# Patient Record
Sex: FEMALE | Race: WHITE | NOT HISPANIC OR LATINO | Employment: UNEMPLOYED | URBAN - METROPOLITAN AREA
[De-identification: names, ages, dates, MRNs, and addresses within clinical notes are randomized per-mention and may not be internally consistent; named-entity substitution may affect disease eponyms.]

---

## 2019-04-02 ENCOUNTER — CONVERSION ENCOUNTER (OUTPATIENT)
Dept: OTHER | Facility: HOSPITAL | Age: 24
End: 2019-04-02

## 2019-04-02 ENCOUNTER — OFFICE VISIT CONVERTED (OUTPATIENT)
Dept: OTHER | Facility: HOSPITAL | Age: 24
End: 2019-04-02
Attending: NURSE PRACTITIONER

## 2019-04-02 ENCOUNTER — HOSPITAL ENCOUNTER (OUTPATIENT)
Dept: OTHER | Facility: HOSPITAL | Age: 24
Discharge: HOME | End: 2019-04-02
Attending: NURSE PRACTITIONER

## 2019-04-02 LAB
ALBUMIN SERPL-MCNC: 3.9 G/DL (ref 3.5–5)
ALBUMIN/GLOB SERPL: 1.4 {RATIO} (ref 1.4–2.6)
ALP SERPL-CCNC: 47 U/L (ref 42–98)
ALT SERPL-CCNC: 9 U/L (ref 10–40)
ANION GAP SERPL CALC-SCNC: 15 MMOL/L (ref 8–19)
AST SERPL-CCNC: 11 U/L (ref 15–50)
BASOPHILS # BLD AUTO: 0.03 10*3/UL (ref 0–0.2)
BASOPHILS NFR BLD AUTO: 0.4 % (ref 0–3)
BILIRUB SERPL-MCNC: 0.48 MG/DL (ref 0.2–1.3)
BUN SERPL-MCNC: 6 MG/DL (ref 5–25)
BUN/CREAT SERPL: 9 {RATIO} (ref 6–20)
CALCIUM SERPL-MCNC: 8.8 MG/DL (ref 8.7–10.4)
CHLORIDE SERPL-SCNC: 103 MMOL/L (ref 99–111)
CONV ABS IMM GRAN: 0.02 10*3/UL (ref 0–0.2)
CONV CO2: 26 MMOL/L (ref 22–32)
CONV IMMATURE GRAN: 0.3 % (ref 0–1.8)
CONV TOTAL PROTEIN: 6.7 G/DL (ref 6.3–8.2)
CREAT UR-MCNC: 0.64 MG/DL (ref 0.5–0.9)
DEPRECATED RDW RBC AUTO: 47.8 FL (ref 36.4–46.3)
EOSINOPHIL # BLD AUTO: 0.1 10*3/UL (ref 0–0.7)
EOSINOPHIL # BLD AUTO: 1.3 % (ref 0–7)
ERYTHROCYTE [DISTWIDTH] IN BLOOD BY AUTOMATED COUNT: 13.9 % (ref 11.7–14.4)
GFR SERPLBLD BASED ON 1.73 SQ M-ARVRAT: >60 ML/MIN/{1.73_M2}
GLOBULIN UR ELPH-MCNC: 2.8 G/DL (ref 2–3.5)
GLUCOSE SERPL-MCNC: 86 MG/DL (ref 65–99)
HBA1C MFR BLD: 13 G/DL (ref 12–16)
HCT VFR BLD AUTO: 41.4 % (ref 37–47)
LYMPHOCYTES # BLD AUTO: 4.33 10*3/UL (ref 1–5)
MCH RBC QN AUTO: 29.3 PG (ref 27–31)
MCHC RBC AUTO-ENTMCNC: 31.4 G/DL (ref 33–37)
MCV RBC AUTO: 93.2 FL (ref 81–99)
MONOCYTES # BLD AUTO: 0.47 10*3/UL (ref 0.2–1.2)
MONOCYTES NFR BLD AUTO: 6 % (ref 3–10)
NEUTROPHILS # BLD AUTO: 2.84 10*3/UL (ref 2–8)
NEUTROPHILS NFR BLD AUTO: 36.4 % (ref 30–85)
NRBC CBCN: 0 % (ref 0–0.7)
OSMOLALITY SERPL CALC.SUM OF ELEC: 287 MOSM/KG (ref 273–304)
PLATELET # BLD AUTO: 215 10*3/UL (ref 130–400)
PMV BLD AUTO: 12.6 FL (ref 9.4–12.3)
POTASSIUM SERPL-SCNC: 3.6 MMOL/L (ref 3.5–5.3)
RBC # BLD AUTO: 4.44 10*6/UL (ref 4.2–5.4)
SODIUM SERPL-SCNC: 140 MMOL/L (ref 135–147)
T4 FREE SERPL-MCNC: 1.1 NG/DL (ref 0.9–1.8)
TSH SERPL-ACNC: 2.83 M[IU]/L (ref 0.27–4.2)
VARIANT LYMPHS NFR BLD MANUAL: 55.6 % (ref 20–45)
WBC # BLD AUTO: 7.79 10*3/UL (ref 4.8–10.8)

## 2020-06-25 ENCOUNTER — APPOINTMENT (OUTPATIENT)
Dept: MRI IMAGING | Facility: HOSPITAL | Age: 25
End: 2020-06-25

## 2020-06-25 ENCOUNTER — HOSPITAL ENCOUNTER (OUTPATIENT)
Facility: HOSPITAL | Age: 25
Setting detail: OBSERVATION
Discharge: HOME | End: 2020-06-27
Attending: EMERGENCY MEDICINE | Admitting: INTERNAL MEDICINE

## 2020-06-25 DIAGNOSIS — R32 URINARY INCONTINENCE, UNSPECIFIED TYPE: ICD-10-CM

## 2020-06-25 DIAGNOSIS — M51.24 THORACIC DISC HERNIATION: Primary | ICD-10-CM

## 2020-06-25 PROBLEM — N31.9 NEUROGENIC BLADDER: Status: ACTIVE | Noted: 2020-06-25

## 2020-06-25 PROBLEM — M54.9 CHRONIC BACK PAIN: Status: ACTIVE | Noted: 2020-06-25

## 2020-06-25 PROBLEM — G89.29 CHRONIC BACK PAIN: Status: ACTIVE | Noted: 2020-06-25

## 2020-06-25 PROBLEM — Z98.890 HISTORY OF BACK SURGERY: Status: ACTIVE | Noted: 2020-06-25

## 2020-06-25 PROBLEM — F41.9 ANXIETY: Status: ACTIVE | Noted: 2020-06-25

## 2020-06-25 LAB
ANION GAP SERPL CALCULATED.3IONS-SCNC: 12.4 MMOL/L (ref 5–15)
BACTERIA UR QL AUTO: ABNORMAL /HPF
BILIRUB UR QL STRIP: NEGATIVE
BUN BLD-MCNC: 9 MG/DL (ref 6–20)
BUN/CREAT SERPL: 12.7 (ref 7–25)
CALCIUM SPEC-SCNC: 8.6 MG/DL (ref 8.6–10.5)
CHLORIDE SERPL-SCNC: 102 MMOL/L (ref 98–107)
CLARITY UR: ABNORMAL
CO2 SERPL-SCNC: 19.6 MMOL/L (ref 22–29)
COLOR UR: YELLOW
CREAT BLD-MCNC: 0.71 MG/DL (ref 0.57–1)
GFR SERPL CREATININE-BSD FRML MDRD: 100 ML/MIN/1.73
GLUCOSE BLD-MCNC: 101 MG/DL (ref 65–99)
GLUCOSE UR STRIP-MCNC: NEGATIVE MG/DL
HCG SERPL QL: NEGATIVE
HGB UR QL STRIP.AUTO: ABNORMAL
HYALINE CASTS UR QL AUTO: ABNORMAL /LPF
KETONES UR QL STRIP: ABNORMAL
LEUKOCYTE ESTERASE UR QL STRIP.AUTO: ABNORMAL
NITRITE UR QL STRIP: NEGATIVE
PH UR STRIP.AUTO: 5.5 [PH] (ref 5–8)
POTASSIUM BLD-SCNC: 4.3 MMOL/L (ref 3.5–5.2)
PROT UR QL STRIP: ABNORMAL
RBC # UR: ABNORMAL /HPF
REF LAB TEST METHOD: ABNORMAL
SODIUM BLD-SCNC: 134 MMOL/L (ref 136–145)
SP GR UR STRIP: >=1.03 (ref 1–1.03)
SQUAMOUS #/AREA URNS HPF: ABNORMAL /HPF
UROBILINOGEN UR QL STRIP: ABNORMAL
WBC UR QL AUTO: ABNORMAL /HPF

## 2020-06-25 PROCEDURE — 0 GADOBENATE DIMEGLUMINE 529 MG/ML SOLUTION: Performed by: EMERGENCY MEDICINE

## 2020-06-25 PROCEDURE — 84703 CHORIONIC GONADOTROPIN ASSAY: CPT | Performed by: EMERGENCY MEDICINE

## 2020-06-25 PROCEDURE — 25010000002 KETOROLAC TROMETHAMINE PER 15 MG: Performed by: EMERGENCY MEDICINE

## 2020-06-25 PROCEDURE — 25010000002 DEXAMETHASONE SODIUM PHOSPHATE 100 MG/10ML SOLUTION 10 ML VIAL: Performed by: EMERGENCY MEDICINE

## 2020-06-25 PROCEDURE — G0378 HOSPITAL OBSERVATION PER HR: HCPCS

## 2020-06-25 PROCEDURE — A9577 INJ MULTIHANCE: HCPCS | Performed by: EMERGENCY MEDICINE

## 2020-06-25 PROCEDURE — 99283 EMERGENCY DEPT VISIT LOW MDM: CPT

## 2020-06-25 PROCEDURE — 81001 URINALYSIS AUTO W/SCOPE: CPT | Performed by: EMERGENCY MEDICINE

## 2020-06-25 PROCEDURE — 80048 BASIC METABOLIC PNL TOTAL CA: CPT | Performed by: EMERGENCY MEDICINE

## 2020-06-25 PROCEDURE — 96375 TX/PRO/DX INJ NEW DRUG ADDON: CPT

## 2020-06-25 PROCEDURE — 72158 MRI LUMBAR SPINE W/O & W/DYE: CPT

## 2020-06-25 PROCEDURE — 96374 THER/PROPH/DIAG INJ IV PUSH: CPT

## 2020-06-25 RX ORDER — SODIUM CHLORIDE 0.9 % (FLUSH) 0.9 %
10 SYRINGE (ML) INJECTION EVERY 12 HOURS SCHEDULED
Status: DISCONTINUED | OUTPATIENT
Start: 2020-06-25 | End: 2020-06-27 | Stop reason: HOSPADM

## 2020-06-25 RX ORDER — HYDROXYZINE HYDROCHLORIDE 25 MG/1
25 TABLET, FILM COATED ORAL EVERY 8 HOURS PRN
Status: DISCONTINUED | OUTPATIENT
Start: 2020-06-25 | End: 2020-06-27 | Stop reason: HOSPADM

## 2020-06-25 RX ORDER — SODIUM CHLORIDE 0.9 % (FLUSH) 0.9 %
10 SYRINGE (ML) INJECTION AS NEEDED
Status: DISCONTINUED | OUTPATIENT
Start: 2020-06-25 | End: 2020-06-27 | Stop reason: HOSPADM

## 2020-06-25 RX ORDER — ONDANSETRON 2 MG/ML
4 INJECTION INTRAMUSCULAR; INTRAVENOUS EVERY 6 HOURS PRN
Status: DISCONTINUED | OUTPATIENT
Start: 2020-06-25 | End: 2020-06-27 | Stop reason: HOSPADM

## 2020-06-25 RX ORDER — DEXAMETHASONE SODIUM PHOSPHATE 4 MG/ML
8 INJECTION, SOLUTION INTRA-ARTICULAR; INTRALESIONAL; INTRAMUSCULAR; INTRAVENOUS; SOFT TISSUE EVERY 6 HOURS
Status: DISCONTINUED | OUTPATIENT
Start: 2020-06-26 | End: 2020-06-27 | Stop reason: HOSPADM

## 2020-06-25 RX ORDER — ACETAMINOPHEN 160 MG/5ML
650 SOLUTION ORAL EVERY 4 HOURS PRN
Status: DISCONTINUED | OUTPATIENT
Start: 2020-06-25 | End: 2020-06-27 | Stop reason: HOSPADM

## 2020-06-25 RX ORDER — HYDROXYZINE HYDROCHLORIDE 25 MG/1
25 TABLET, FILM COATED ORAL EVERY 8 HOURS PRN
COMMUNITY
End: 2020-08-04

## 2020-06-25 RX ORDER — KETOROLAC TROMETHAMINE 15 MG/ML
15 INJECTION, SOLUTION INTRAMUSCULAR; INTRAVENOUS ONCE
Status: COMPLETED | OUTPATIENT
Start: 2020-06-25 | End: 2020-06-25

## 2020-06-25 RX ORDER — ACETAMINOPHEN 650 MG/1
650 SUPPOSITORY RECTAL EVERY 4 HOURS PRN
Status: DISCONTINUED | OUTPATIENT
Start: 2020-06-25 | End: 2020-06-27 | Stop reason: HOSPADM

## 2020-06-25 RX ORDER — CYCLOBENZAPRINE HCL 10 MG
10 TABLET ORAL 3 TIMES DAILY PRN
Status: DISCONTINUED | OUTPATIENT
Start: 2020-06-25 | End: 2020-06-27 | Stop reason: HOSPADM

## 2020-06-25 RX ORDER — ACETAMINOPHEN 325 MG/1
650 TABLET ORAL EVERY 4 HOURS PRN
Status: DISCONTINUED | OUTPATIENT
Start: 2020-06-25 | End: 2020-06-27 | Stop reason: HOSPADM

## 2020-06-25 RX ADMIN — GADOBENATE DIMEGLUMINE 20 ML: 529 INJECTION, SOLUTION INTRAVENOUS at 20:21

## 2020-06-25 RX ADMIN — KETOROLAC TROMETHAMINE 15 MG: 15 INJECTION, SOLUTION INTRAMUSCULAR; INTRAVENOUS at 18:36

## 2020-06-25 RX ADMIN — DEXAMETHASONE SODIUM PHOSPHATE 10 MG: 10 INJECTION, SOLUTION INTRAMUSCULAR; INTRAVENOUS at 21:57

## 2020-06-25 RX ADMIN — CYCLOBENZAPRINE 10 MG: 10 TABLET, FILM COATED ORAL at 23:42

## 2020-06-25 RX ADMIN — SODIUM CHLORIDE, PRESERVATIVE FREE 10 ML: 5 INJECTION INTRAVENOUS at 23:42

## 2020-06-26 ENCOUNTER — APPOINTMENT (OUTPATIENT)
Dept: MRI IMAGING | Facility: HOSPITAL | Age: 25
End: 2020-06-26

## 2020-06-26 LAB
ANION GAP SERPL CALCULATED.3IONS-SCNC: 11.3 MMOL/L (ref 5–15)
BUN BLD-MCNC: 11 MG/DL (ref 6–20)
BUN/CREAT SERPL: 14.7 (ref 7–25)
CALCIUM SPEC-SCNC: 8.9 MG/DL (ref 8.6–10.5)
CHLORIDE SERPL-SCNC: 104 MMOL/L (ref 98–107)
CO2 SERPL-SCNC: 19.7 MMOL/L (ref 22–29)
CREAT BLD-MCNC: 0.75 MG/DL (ref 0.57–1)
DEPRECATED RDW RBC AUTO: 42.6 FL (ref 37–54)
ERYTHROCYTE [DISTWIDTH] IN BLOOD BY AUTOMATED COUNT: 13.2 % (ref 12.3–15.4)
GFR SERPL CREATININE-BSD FRML MDRD: 93 ML/MIN/1.73
GLUCOSE BLD-MCNC: 142 MG/DL (ref 65–99)
HCT VFR BLD AUTO: 38.7 % (ref 34–46.6)
HGB BLD-MCNC: 13.3 G/DL (ref 12–15.9)
MCH RBC QN AUTO: 30.2 PG (ref 26.6–33)
MCHC RBC AUTO-ENTMCNC: 34.4 G/DL (ref 31.5–35.7)
MCV RBC AUTO: 87.8 FL (ref 79–97)
PLATELET # BLD AUTO: 234 10*3/MM3 (ref 140–450)
PMV BLD AUTO: 11.1 FL (ref 6–12)
POTASSIUM BLD-SCNC: 4.4 MMOL/L (ref 3.5–5.2)
RBC # BLD AUTO: 4.41 10*6/MM3 (ref 3.77–5.28)
SARS-COV-2 N GENE NPH QL NAA+PROBE: NOT DETECTED
SODIUM BLD-SCNC: 135 MMOL/L (ref 136–145)
WBC NRBC COR # BLD: 8.55 10*3/MM3 (ref 3.4–10.8)

## 2020-06-26 PROCEDURE — 25010000002 ONDANSETRON PER 1 MG: Performed by: NURSE PRACTITIONER

## 2020-06-26 PROCEDURE — 25010000002 MORPHINE PER 10 MG: Performed by: NURSE PRACTITIONER

## 2020-06-26 PROCEDURE — 96376 TX/PRO/DX INJ SAME DRUG ADON: CPT

## 2020-06-26 PROCEDURE — 72157 MRI CHEST SPINE W/O & W/DYE: CPT

## 2020-06-26 PROCEDURE — G0378 HOSPITAL OBSERVATION PER HR: HCPCS

## 2020-06-26 PROCEDURE — 0 GADOBENATE DIMEGLUMINE 529 MG/ML SOLUTION: Performed by: INTERNAL MEDICINE

## 2020-06-26 PROCEDURE — 25010000002 DEXAMETHASONE PER 1 MG: Performed by: NURSE PRACTITIONER

## 2020-06-26 PROCEDURE — 87635 SARS-COV-2 COVID-19 AMP PRB: CPT | Performed by: NURSE PRACTITIONER

## 2020-06-26 PROCEDURE — 85027 COMPLETE CBC AUTOMATED: CPT | Performed by: NURSE PRACTITIONER

## 2020-06-26 PROCEDURE — A9577 INJ MULTIHANCE: HCPCS | Performed by: INTERNAL MEDICINE

## 2020-06-26 PROCEDURE — C9803 HOPD COVID-19 SPEC COLLECT: HCPCS | Performed by: NURSE PRACTITIONER

## 2020-06-26 PROCEDURE — 99203 OFFICE O/P NEW LOW 30 MIN: CPT | Performed by: NEUROLOGICAL SURGERY

## 2020-06-26 PROCEDURE — 96375 TX/PRO/DX INJ NEW DRUG ADDON: CPT

## 2020-06-26 PROCEDURE — 80048 BASIC METABOLIC PNL TOTAL CA: CPT | Performed by: NURSE PRACTITIONER

## 2020-06-26 PROCEDURE — 36415 COLL VENOUS BLD VENIPUNCTURE: CPT | Performed by: NURSE PRACTITIONER

## 2020-06-26 RX ORDER — NICOTINE 21 MG/24HR
1 PATCH, TRANSDERMAL 24 HOURS TRANSDERMAL
Status: DISCONTINUED | OUTPATIENT
Start: 2020-06-26 | End: 2020-06-27 | Stop reason: HOSPADM

## 2020-06-26 RX ORDER — MORPHINE SULFATE 2 MG/ML
2 INJECTION, SOLUTION INTRAMUSCULAR; INTRAVENOUS EVERY 4 HOURS PRN
Status: DISCONTINUED | OUTPATIENT
Start: 2020-06-26 | End: 2020-06-27 | Stop reason: HOSPADM

## 2020-06-26 RX ORDER — HYDROCODONE BITARTRATE AND ACETAMINOPHEN 7.5; 325 MG/1; MG/1
1 TABLET ORAL EVERY 6 HOURS PRN
Status: DISCONTINUED | OUTPATIENT
Start: 2020-06-26 | End: 2020-06-27 | Stop reason: HOSPADM

## 2020-06-26 RX ADMIN — NICOTINE 1 PATCH: 21 PATCH, EXTENDED RELEASE TRANSDERMAL at 13:22

## 2020-06-26 RX ADMIN — DEXAMETHASONE SODIUM PHOSPHATE 8 MG: 4 INJECTION, SOLUTION INTRAMUSCULAR; INTRAVENOUS at 09:17

## 2020-06-26 RX ADMIN — CYCLOBENZAPRINE 10 MG: 10 TABLET, FILM COATED ORAL at 17:55

## 2020-06-26 RX ADMIN — ONDANSETRON 4 MG: 2 INJECTION INTRAMUSCULAR; INTRAVENOUS at 23:12

## 2020-06-26 RX ADMIN — SODIUM CHLORIDE, PRESERVATIVE FREE 10 ML: 5 INJECTION INTRAVENOUS at 09:17

## 2020-06-26 RX ADMIN — ACETAMINOPHEN 650 MG: 325 TABLET ORAL at 09:31

## 2020-06-26 RX ADMIN — CYCLOBENZAPRINE 10 MG: 10 TABLET, FILM COATED ORAL at 07:50

## 2020-06-26 RX ADMIN — DEXAMETHASONE SODIUM PHOSPHATE 8 MG: 4 INJECTION, SOLUTION INTRAMUSCULAR; INTRAVENOUS at 15:21

## 2020-06-26 RX ADMIN — HYDROCODONE BITARTRATE AND ACETAMINOPHEN 1 TABLET: 7.5; 325 TABLET ORAL at 13:42

## 2020-06-26 RX ADMIN — GADOBENATE DIMEGLUMINE 20 ML: 529 INJECTION, SOLUTION INTRAVENOUS at 22:45

## 2020-06-26 RX ADMIN — MORPHINE SULFATE 2 MG: 2 INJECTION, SOLUTION INTRAMUSCULAR; INTRAVENOUS at 23:12

## 2020-06-26 RX ADMIN — SODIUM CHLORIDE, PRESERVATIVE FREE 10 ML: 5 INJECTION INTRAVENOUS at 20:44

## 2020-06-26 RX ADMIN — DEXAMETHASONE SODIUM PHOSPHATE 8 MG: 4 INJECTION, SOLUTION INTRAMUSCULAR; INTRAVENOUS at 02:58

## 2020-06-26 RX ADMIN — HYDROCODONE BITARTRATE AND ACETAMINOPHEN 1 TABLET: 7.5; 325 TABLET ORAL at 20:44

## 2020-06-26 RX ADMIN — DEXAMETHASONE SODIUM PHOSPHATE 8 MG: 4 INJECTION, SOLUTION INTRAMUSCULAR; INTRAVENOUS at 20:44

## 2020-06-27 VITALS
DIASTOLIC BLOOD PRESSURE: 49 MMHG | BODY MASS INDEX: 33.04 KG/M2 | SYSTOLIC BLOOD PRESSURE: 94 MMHG | OXYGEN SATURATION: 98 % | HEIGHT: 71 IN | WEIGHT: 236 LBS | RESPIRATION RATE: 16 BRPM | HEART RATE: 68 BPM | TEMPERATURE: 98 F

## 2020-06-27 PROCEDURE — G0378 HOSPITAL OBSERVATION PER HR: HCPCS

## 2020-06-27 PROCEDURE — 25010000002 MORPHINE PER 10 MG: Performed by: NURSE PRACTITIONER

## 2020-06-27 PROCEDURE — 25010000002 ONDANSETRON PER 1 MG: Performed by: NURSE PRACTITIONER

## 2020-06-27 PROCEDURE — 99213 OFFICE O/P EST LOW 20 MIN: CPT | Performed by: NURSE PRACTITIONER

## 2020-06-27 PROCEDURE — 25010000002 DEXAMETHASONE PER 1 MG: Performed by: NURSE PRACTITIONER

## 2020-06-27 PROCEDURE — 96376 TX/PRO/DX INJ SAME DRUG ADON: CPT

## 2020-06-27 RX ORDER — OXYCODONE HYDROCHLORIDE AND ACETAMINOPHEN 5; 325 MG/1; MG/1
1 TABLET ORAL EVERY 6 HOURS PRN
Qty: 20 TABLET | Refills: 0 | Status: SHIPPED | OUTPATIENT
Start: 2020-06-27 | End: 2020-07-02 | Stop reason: SDUPTHER

## 2020-06-27 RX ORDER — PREDNISONE 10 MG/1
10 TABLET ORAL TAKE AS DIRECTED
Qty: 20 TABLET | Refills: 0 | Status: SHIPPED | OUTPATIENT
Start: 2020-06-27 | End: 2020-08-04

## 2020-06-27 RX ADMIN — MORPHINE SULFATE 2 MG: 2 INJECTION, SOLUTION INTRAMUSCULAR; INTRAVENOUS at 05:00

## 2020-06-27 RX ADMIN — DEXAMETHASONE SODIUM PHOSPHATE 8 MG: 4 INJECTION, SOLUTION INTRAMUSCULAR; INTRAVENOUS at 10:42

## 2020-06-27 RX ADMIN — HYDROCODONE BITARTRATE AND ACETAMINOPHEN 1 TABLET: 7.5; 325 TABLET ORAL at 08:19

## 2020-06-27 RX ADMIN — ONDANSETRON 4 MG: 2 INJECTION INTRAMUSCULAR; INTRAVENOUS at 05:00

## 2020-06-27 RX ADMIN — HYDROXYZINE HYDROCHLORIDE 25 MG: 25 TABLET, FILM COATED ORAL at 10:43

## 2020-06-27 RX ADMIN — DEXAMETHASONE SODIUM PHOSPHATE 8 MG: 4 INJECTION, SOLUTION INTRAMUSCULAR; INTRAVENOUS at 05:00

## 2020-06-27 RX ADMIN — SODIUM CHLORIDE, PRESERVATIVE FREE 10 ML: 5 INJECTION INTRAVENOUS at 08:20

## 2020-06-27 RX ADMIN — NICOTINE 1 PATCH: 21 PATCH, EXTENDED RELEASE TRANSDERMAL at 10:43

## 2020-07-02 ENCOUNTER — TELEPHONE (OUTPATIENT)
Dept: NEUROSURGERY | Facility: CLINIC | Age: 25
End: 2020-07-02

## 2020-07-02 DIAGNOSIS — M51.24 THORACIC DISC HERNIATION: ICD-10-CM

## 2020-07-02 RX ORDER — OXYCODONE HYDROCHLORIDE AND ACETAMINOPHEN 5; 325 MG/1; MG/1
1 TABLET ORAL EVERY 8 HOURS PRN
Qty: 20 TABLET | Refills: 0 | Status: SHIPPED | OUTPATIENT
Start: 2020-07-02 | End: 2020-07-09

## 2020-07-02 NOTE — TELEPHONE ENCOUNTER
We can give her 20 more Percocet while she is waiting to get into see a pain management doctor.  What kind of pain management doctor does she see that does not prescribe pain medicines?

## 2020-07-02 NOTE — TELEPHONE ENCOUNTER
Patient is aware, this will be her last RX. She will talk to her PCP about getting into a new pain mgmt clinic. She is currently at one that only does procedures.

## 2020-07-02 NOTE — TELEPHONE ENCOUNTER
PATIENT IS SCHEDULE FOR 8/4 TO SEE DR IBRAHIM.  SHE STATES THAT PRIOR TO HER ER VISIT, SHE WAS NOT ON ANY TREATMENT PLAN FOR PAIN OTHER THAN EPIDURAL SHOTS.  SHE IS WONDERING IF SHE CAN GET SOMETHING FOR PAIN UNTIL HER APPT. PLEASE CALL PATIENT AND ADVISE.    [phone]

## 2020-07-02 NOTE — TELEPHONE ENCOUNTER
Patient is aware we do not prescribe anything for pain unless a patient has had surgery. She was sent home with MDP and Percocet's from the hospitalist. The pain mgmt doctor she see's does not prescribe anything for pain either. She wants to know if she can see a pain mgmt doctor on her own? I told her to get a referral from her PCP as she has [employer] insurance.

## 2020-08-03 NOTE — PROGRESS NOTES
Subjective   History of Present Illness: Poppy Carvalho is a 25 y.o. female is here today for hospital follow-up. Ms. Carvalho was last seen 6/26/2020 for back and leg pain.    Today in the office she reports that she has back pain that radiates into bilateral lower extremity.  Patient is wearing a mask in our office today.      History of Present Illness     This patient returns today.  She has had no further issues with bladder incontinence or any other sacral nerve dysfunction.  She does still have a lot of pain particularly in her right leg radiating down the posterior lateral thigh posterior lateral calf and into the right foot.  She says that when she sits for a long time her right foot becomes numb.  She has some symptoms in the left side as well but not as bad as the right.  She had one epidural block and it is after that she had the bladder incontinence.    The following portions of the patient's history were reviewed and updated as appropriate: allergies, current medications, past family history, past medical history, past social history, past surgical history and problem list.    Review of Systems   Respiratory: Negative for chest tightness and shortness of breath.    Cardiovascular: Negative for chest pain.   Musculoskeletal: Positive for back pain and myalgias.        Bilateral lower extremity pain   Neurological: Negative for weakness and numbness.       Objective     Vitals:    08/04/20 0839   BP: 128/78   Pulse: 79   Temp: 97.7 °F (36.5 °C)     There is no height or weight on file to calculate BMI.      Physical Exam   Constitutional: She is oriented to person, place, and time. She appears well-developed and well-nourished.   Eyes: Pupils are equal, round, and reactive to light. EOM are normal.   Neurological: She is oriented to person, place, and time. She has a normal Finger-Nose-Finger Test and a normal Heel to Shin Test. Gait normal.   Reflex Scores:       Tricep reflexes are 2+ on the right side and 2+  on the left side.       Bicep reflexes are 2+ on the right side and 2+ on the left side.       Brachioradialis reflexes are 2+ on the right side and 2+ on the left side.       Patellar reflexes are 2+ on the right side and 2+ on the left side.       Achilles reflexes are 2+ on the right side and 2+ on the left side.  Psychiatric: Her speech is normal.     Neurologic Exam     Mental Status   Oriented to person, place, and time.   Registration of memory: Good recent and remote memory.   Attention: normal. Concentration: normal.   Speech: speech is normal   Level of consciousness: alert  Knowledge: consistent with education.     Cranial Nerves     CN II   Visual fields full to confrontation.   Visual acuity: normal    CN III, IV, VI   Pupils are equal, round, and reactive to light.  Extraocular motions are normal.     CN V   Facial sensation intact.   Right corneal reflex: normal  Left corneal reflex: normal    CN VII   Facial expression full, symmetric.   Right facial weakness: none  Left facial weakness: none    CN VIII   Hearing: intact    CN IX, X   Palate: symmetric    CN XI   Right sternocleidomastoid strength: normal  Left sternocleidomastoid strength: normal    CN XII   Tongue: not atrophic  Tongue deviation: none    Motor Exam   Muscle bulk: normal  Right arm tone: normal  Left arm tone: normal  Right leg tone: normal  Left leg tone: normal    Strength   Strength 5/5 except as noted.     Sensory Exam   Light touch normal.     Gait, Coordination, and Reflexes     Gait  Gait: normal    Coordination   Finger to nose coordination: normal  Heel to shin coordination: normal    Reflexes   Right brachioradialis: 2+  Left brachioradialis: 2+  Right biceps: 2+  Left biceps: 2+  Right triceps: 2+  Left triceps: 2+  Right patellar: 2+  Left patellar: 2+  Right achilles: 2+  Left achilles: 2+  Right : 2+  Left : 2+          Assessment/Plan   Independent Review of Radiographic Studies:      I personally reviewed the  images from the following studies.    I again reviewed the MRIs that were done on 26 July.  This does show a disc bulge at T6-T7 and T10-T11 which is causing a little pressure on the spinal cord but there is still spinal fluid behind the spinal cord and no evidence of any severe spinal cord compression.  In the lumbar spine there is a bulging disc that is central and slightly to the right side at L4-5.    Medical Decision Making:      I had a very long discussion with the patient about the situation.  I told her I truly do not think that taking the discs in the thoracic spine out is either necessary or would do her any good.  I told her that her symptoms are most likely coming from the L4-5 level.  Since she had the issue with the epidural blocks I would not propose doing any further blocks.  Therefore her only options at this point are to continue to live with it or to proceed with surgery.  I clearly told her that my recommendation would be to lose some weight and do some exercises to avoid surgery but she does not feel she can do that.  I also clearly told her that the chances of a good result from surgery are markedly decreased since the disc is not very big but the pain is severe.  I told the patient about the risks, complications and expected outcome of the lumbar surgery.  I explained that there was an 60% chance of getting rid of the pain in the leg.  I explained that there would still be back pain after the surgery.  Initially this will be quite severe but will improve over time.  There is a 2 or 3% chance of infection, bleeding, CSF leak, damage to the nerve as a result of surgery, paralysis, as well as anesthetic risk.  There is a 5% chance of late instability which could require a fusion later on and a 10% chance of recurrent disc herniation.  We discussed the postoperative hospital and home course.  I also explained that we do not use very much pain medication after surgery.    She will need to be  scheduled for a: Right lumbar 4 lumbar 5 laminectomy and discectomy with metrx    Poppy was seen today for back pain and leg pain.    Diagnoses and all orders for this visit:    Neuritis or radiculitis due to rupture of lumbar intervertebral disc      Return for 2-3 week post op.

## 2020-08-04 ENCOUNTER — PREP FOR SURGERY (OUTPATIENT)
Dept: OTHER | Facility: HOSPITAL | Age: 25
End: 2020-08-04

## 2020-08-04 ENCOUNTER — OFFICE VISIT (OUTPATIENT)
Dept: NEUROSURGERY | Facility: CLINIC | Age: 25
End: 2020-08-04

## 2020-08-04 VITALS — TEMPERATURE: 97.7 F | DIASTOLIC BLOOD PRESSURE: 78 MMHG | SYSTOLIC BLOOD PRESSURE: 128 MMHG | HEART RATE: 79 BPM

## 2020-08-04 DIAGNOSIS — M51.16 NEURITIS OR RADICULITIS DUE TO RUPTURE OF LUMBAR INTERVERTEBRAL DISC: Primary | ICD-10-CM

## 2020-08-04 PROCEDURE — 99213 OFFICE O/P EST LOW 20 MIN: CPT | Performed by: NEUROLOGICAL SURGERY

## 2020-08-04 RX ORDER — CEFAZOLIN SODIUM 2 G/100ML
2 INJECTION, SOLUTION INTRAVENOUS ONCE
Status: CANCELLED | OUTPATIENT
Start: 2020-08-14 | End: 2020-08-04

## 2020-08-05 ENCOUNTER — APPOINTMENT (OUTPATIENT)
Dept: PREADMISSION TESTING | Facility: HOSPITAL | Age: 25
End: 2020-08-05

## 2020-08-05 ENCOUNTER — TRANSCRIBE ORDERS (OUTPATIENT)
Dept: PREADMISSION TESTING | Facility: HOSPITAL | Age: 25
End: 2020-08-05

## 2020-08-05 VITALS
SYSTOLIC BLOOD PRESSURE: 104 MMHG | RESPIRATION RATE: 16 BRPM | HEIGHT: 71 IN | OXYGEN SATURATION: 99 % | BODY MASS INDEX: 33.12 KG/M2 | TEMPERATURE: 99.2 F | HEART RATE: 87 BPM | DIASTOLIC BLOOD PRESSURE: 72 MMHG | WEIGHT: 236.6 LBS

## 2020-08-05 LAB
DEPRECATED RDW RBC AUTO: 42.5 FL (ref 37–54)
ERYTHROCYTE [DISTWIDTH] IN BLOOD BY AUTOMATED COUNT: 13 % (ref 12.3–15.4)
HCT VFR BLD AUTO: 42.3 % (ref 34–46.6)
HGB BLD-MCNC: 14.2 G/DL (ref 12–15.9)
MCH RBC QN AUTO: 29.8 PG (ref 26.6–33)
MCHC RBC AUTO-ENTMCNC: 33.6 G/DL (ref 31.5–35.7)
MCV RBC AUTO: 88.7 FL (ref 79–97)
PLATELET # BLD AUTO: 231 10*3/MM3 (ref 140–450)
PMV BLD AUTO: 11.5 FL (ref 6–12)
RBC # BLD AUTO: 4.77 10*6/MM3 (ref 3.77–5.28)
WBC # BLD AUTO: 9.74 10*3/MM3 (ref 3.4–10.8)

## 2020-08-05 PROCEDURE — 85027 COMPLETE CBC AUTOMATED: CPT | Performed by: NEUROLOGICAL SURGERY

## 2020-08-05 PROCEDURE — 36415 COLL VENOUS BLD VENIPUNCTURE: CPT

## 2020-08-05 RX ORDER — OXYCODONE HYDROCHLORIDE AND ACETAMINOPHEN 5; 325 MG/1; MG/1
1 TABLET ORAL EVERY 6 HOURS PRN
COMMUNITY
End: 2020-08-14 | Stop reason: HOSPADM

## 2020-08-05 NOTE — DISCHARGE INSTRUCTIONS
CHLORHEXIDINE CLOTH INSTRUCTIONS  The morning of surgery follow these instructions using the Chlorhexidine cloths you've been given.  These steps reduce bacteria on the body.  Do not use the cloths near your eyes, ears mouth, genitalia or on open wounds.  Throw the cloths away after use but do not try to flush them down a toilet.      • Open and remove one cloth at a time from the package.    • Leave the cloth unfolded and begin the bathing.  • Massage the skin with the cloths using gentle pressure to remove bacteria.  Do not scrub harshly.   • Follow the steps below with one 2% CHG cloth per area (6 total cloths).  • One cloth for neck, shoulders and chest.  • One cloth for both arms, hands, fingers and underarms (do underarms last).  • One cloth for the abdomen followed by groin.  • One cloth for right leg and foot including between the toes.  • One cloth for left leg and foot including between the toes.  • The last cloth is to be used for the back of the neck, back and buttocks.    Allow the CHG to air dry 3 minutes on the skin which will give it time to work and decrease the chance of irritation.  The skin may feel sticky until it is dry.  Do not rinse with water or any other liquid or you will lose the beneficial effects of the CHG.  If mild skin irritation occurs, do rinse the skin to remove the CHG.  Report this to the nurse at time of admission.  Do not apply lotions, creams, ointments, deodorants or perfumes after using the clothes. Dress in clean clothes before coming to the hospital    Take the following medications the morning of surgery: PERCOCET     ARRIVAL TIME 05:30        General Instructions:  • Do not eat solid food after midnight the night before surgery.  • You may drink clear liquids day of surgery but must stop at least one hour before your hospital arrival time.  • It is beneficial for you to have a clear drink that contains carbohydrates the day of surgery.  We suggest a 12 to 20 ounce  bottle of Gatorade or Powerade for non-diabetic patients or a 12 to 20 ounce bottle of G2 or Powerade Zero for diabetic patients. (Pediatric patients, are not advised to drink a 12 to 20 ounce carbohydrate drink)    Clear liquids are liquids you can see through.  Nothing red in color.     Plain water                               Sports drinks  Sodas                                   Gelatin (Jell-O)  Fruit juices without pulp such as white grape juice and apple juice  Popsicles that contain no fruit or yogurt  Tea or coffee (no cream or milk added)  Gatorade / Powerade  G2 / Powerade Zero      • Patients who avoid smoking, chewing tobacco and alcohol for 4 weeks prior to surgery have a reduced risk of post-operative complications.  Quit smoking as many days before surgery as you can.  • Do not smoke, use chewing tobacco or drink alcohol the day of surgery.   • If applicable bring your C-PAP/ BI-PAP machine.  • Bring any papers given to you in the doctor’s office.  • Wear clean comfortable clothes.  • Do not wear contact lenses, false eyelashes or make-up.  Bring a case for your glasses.   • Bring crutches or walker if applicable.  • Remove all piercings.  Leave jewelry and any other valuables at home.  • Hair extensions with metal clips must be removed prior to surgery.  • The Pre-Admission Testing nurse will instruct you to bring medications if unable to obtain an accurate list in Pre-Admission Testing.            Preventing a Surgical Site Infection:  • For 2 to 3 days before surgery, avoid shaving with a razor because the razor can irritate skin and make it easier to develop an infection.    • Any areas of open skin can increase the risk of a post-operative wound infection by allowing bacteria to enter and travel throughout the body.  Notify your surgeon if you have any skin wounds / rashes even if it is not near the expected surgical site.  The area will need assessed to determine if surgery should be delayed  until it is healed.  • The night prior to surgery shower using a fresh bar of anti-bacterial soap (such as Dial) and clean washcloth.  Sleep in a clean bed with clean clothing.  Do not allow pets to sleep with you.  • Shower on the morning of surgery using a fresh bar of anti-bacterial soap (such as Dial) and clean washcloth.  Dry with a clean towel and dress in clean clothing.  • Ask your surgeon if you will be receiving antibiotics prior to surgery.  • Make sure you, your family, and all healthcare providers clean their hands with soap and water or an alcohol based hand  before caring for you or your wound.    Day of surgery:  Your arrival time is approximately two hours before your scheduled surgery time.  Upon arrival, a Pre-op nurse and Anesthesiologist will review your health history, obtain vital signs, and answer questions you may have.  The only belongings needed at this time will be a list of your home medications and if applicable your C-PAP/BI-PAP machine.  If you are staying overnight your family can leave the rest of your belongings in the car and bring them to your room later.  A Pre-op nurse will start an IV and you may receive medication in preparation for surgery, including something to help you relax.  Your family will be able to see you in the Pre-op area.  Two visitors at a time will be allowed in the Pre-op room.  While you are in surgery your family should notify the waiting room  if they leave the waiting room area and provide a contact phone number.    Please be aware that surgery does come with discomfort.  We want to make every effort to control your discomfort so please discuss any uncontrolled symptoms with your nurse.   Your doctor will most likely have prescribed pain medications.      If you are going home after surgery you will receive individualized written care instructions before being discharged.  A responsible adult must drive you to and from the hospital on  the day of your surgery and stay with you for 24 hours.    If you are staying overnight following surgery, you will be transported to your hospital room following the recovery period.  [facility] has all private rooms.    If you have any questions please call Pre-Admission Testing at [phone].  Deductibles and co-payments are collected on the day of service. Please be prepared to pay the required co-pay, deductible or deposit on the day of service as defined by your plan.    Patient Education for Self-Quarantine Process    Following your COVID testing, we strongly recommend that you do not leave your home after you have been tested for COVID except to get medical care. This includes not going to work, school or to public areas.  If this is not possible for you to do please limit your activities to only required outings.  Be sure to wear a mask when you are with other people, practice social distancing and wash your hands frequently.      The following items provide additional details to keep you safe.  • Wash your hands with soap and water frequently for at least 20 seconds.   • Avoid touching your eyes, nose and mouth with unwashed hands.  • Do not share anything - utensils, towels, food from the same bowl.   • Have your own utensils, drinking glass, dishes, towels and bedding.   • Do not have visitors.   • Do use FaceTime to stay in touch with family and friends.  • You should stay in a specific room away from others if possible.   • Stay at least 6 feet away from others in the home if you cannot have a dedicated room to yourself.   • Do not snuggle with your pet. While the CDC says there is no evidence that pets can spread COVID-19 or be infected from humans, it is probably best to avoid “petting, snuggling, being kissed or licked and sharing food (during self-quarantine)”, according to the CDC.   • Sanitize household surfaces daily. Include all high touch areas (door handles, light switches,  phones, countertops, etc.)  • Do not share a bathroom with others, if possible.   • Wear a mask around others in your home if you are unable to stay in a separate room or 6 feet apart. If  you are unable to wear a mask, have your family member wear a mask if they must be within 6 feet of you.   Call your surgeon immediately if you experience any of the following symptoms:  • Sore Throat  • Shortness of Breath or difficulty breathing  • Cough  • Chills  • Body soreness or muscle pain  • Headache  • Fever  • New loss of taste or smell  • Do not arrive for your surgery ill.  Your procedure will need to be rescheduled to another time.  You will need to call your physician before the day of surgery to avoid any unnecessary exposure to hospital staff as well as other patients.

## 2020-08-12 ENCOUNTER — LAB (OUTPATIENT)
Dept: LAB | Facility: HOSPITAL | Age: 25
End: 2020-08-12

## 2020-08-12 PROCEDURE — U0004 COV-19 TEST NON-CDC HGH THRU: HCPCS

## 2020-08-12 PROCEDURE — C9803 HOPD COVID-19 SPEC COLLECT: HCPCS

## 2020-08-13 LAB
REF LAB TEST METHOD: NORMAL
SARS-COV-2 RNA RESP QL NAA+PROBE: NOT DETECTED

## 2020-08-14 ENCOUNTER — APPOINTMENT (OUTPATIENT)
Dept: GENERAL RADIOLOGY | Facility: HOSPITAL | Age: 25
End: 2020-08-14

## 2020-08-14 ENCOUNTER — HOSPITAL ENCOUNTER (OUTPATIENT)
Facility: HOSPITAL | Age: 25
Setting detail: HOSPITAL OUTPATIENT SURGERY
Discharge: HOME | End: 2020-08-14
Attending: NEUROLOGICAL SURGERY | Admitting: NEUROLOGICAL SURGERY

## 2020-08-14 ENCOUNTER — ANESTHESIA (OUTPATIENT)
Dept: PERIOP | Facility: HOSPITAL | Age: 25
End: 2020-08-14

## 2020-08-14 ENCOUNTER — ANESTHESIA EVENT (OUTPATIENT)
Dept: PERIOP | Facility: HOSPITAL | Age: 25
End: 2020-08-14

## 2020-08-14 VITALS
BODY MASS INDEX: 33.18 KG/M2 | DIASTOLIC BLOOD PRESSURE: 68 MMHG | SYSTOLIC BLOOD PRESSURE: 110 MMHG | OXYGEN SATURATION: 97 % | TEMPERATURE: 98 F | RESPIRATION RATE: 16 BRPM | HEART RATE: 76 BPM | WEIGHT: 237.9 LBS

## 2020-08-14 DIAGNOSIS — M51.16 NEURITIS OR RADICULITIS DUE TO RUPTURE OF LUMBAR INTERVERTEBRAL DISC: ICD-10-CM

## 2020-08-14 LAB
B-HCG UR QL: NEGATIVE
INTERNAL NEGATIVE CONTROL: NEGATIVE
INTERNAL POSITIVE CONTROL: POSITIVE
Lab: NORMAL

## 2020-08-14 PROCEDURE — 25010000002 HYDROMORPHONE PER 4 MG: Performed by: NURSE ANESTHETIST, CERTIFIED REGISTERED

## 2020-08-14 PROCEDURE — 72100 X-RAY EXAM L-S SPINE 2/3 VWS: CPT

## 2020-08-14 PROCEDURE — 25010000002 MIDAZOLAM PER 1 MG: Performed by: ANESTHESIOLOGY

## 2020-08-14 PROCEDURE — 25010000002 PROPOFOL 10 MG/ML EMULSION: Performed by: NURSE ANESTHETIST, CERTIFIED REGISTERED

## 2020-08-14 PROCEDURE — 25010000002 ONDANSETRON PER 1 MG: Performed by: NURSE ANESTHETIST, CERTIFIED REGISTERED

## 2020-08-14 PROCEDURE — 25010000002 FENTANYL CITRATE (PF) 100 MCG/2ML SOLUTION: Performed by: NURSE ANESTHETIST, CERTIFIED REGISTERED

## 2020-08-14 PROCEDURE — 25010000002 DEXAMETHASONE PER 1 MG: Performed by: NURSE ANESTHETIST, CERTIFIED REGISTERED

## 2020-08-14 PROCEDURE — 25010000003 CEFAZOLIN IN DEXTROSE 2-4 GM/100ML-% SOLUTION: Performed by: NEUROLOGICAL SURGERY

## 2020-08-14 PROCEDURE — 76000 FLUOROSCOPY <1 HR PHYS/QHP: CPT

## 2020-08-14 PROCEDURE — 25010000002 VANCOMYCIN 1 G RECONSTITUTED SOLUTION 1 EACH VIAL: Performed by: NEUROLOGICAL SURGERY

## 2020-08-14 PROCEDURE — 63030 LAMOT DCMPRN NRV RT 1 LMBR: CPT | Performed by: NEUROLOGICAL SURGERY

## 2020-08-14 PROCEDURE — 81025 URINE PREGNANCY TEST: CPT | Performed by: ANESTHESIOLOGY

## 2020-08-14 PROCEDURE — 25010000002 NEOSTIGMINE PER 0.5 MG: Performed by: NURSE ANESTHETIST, CERTIFIED REGISTERED

## 2020-08-14 DEVICE — FLOSEAL HEMOSTATIC MATRIX, 5ML
Type: IMPLANTABLE DEVICE | Site: SPINE LUMBAR | Status: FUNCTIONAL
Brand: FLOSEAL HEMOSTATIC MATRIX

## 2020-08-14 DEVICE — SSC BONE WAX
Type: IMPLANTABLE DEVICE | Site: SPINE LUMBAR | Status: FUNCTIONAL
Brand: SSC BONE WAX

## 2020-08-14 RX ORDER — ONDANSETRON 2 MG/ML
4 INJECTION INTRAMUSCULAR; INTRAVENOUS ONCE AS NEEDED
Status: COMPLETED | OUTPATIENT
Start: 2020-08-14 | End: 2020-08-14

## 2020-08-14 RX ORDER — PROMETHAZINE HYDROCHLORIDE 25 MG/ML
12.5 INJECTION, SOLUTION INTRAMUSCULAR; INTRAVENOUS ONCE AS NEEDED
Status: DISCONTINUED | OUTPATIENT
Start: 2020-08-14 | End: 2020-08-14 | Stop reason: HOSPADM

## 2020-08-14 RX ORDER — GLYCOPYRROLATE 0.2 MG/ML
INJECTION INTRAMUSCULAR; INTRAVENOUS AS NEEDED
Status: DISCONTINUED | OUTPATIENT
Start: 2020-08-14 | End: 2020-08-14 | Stop reason: SURG

## 2020-08-14 RX ORDER — NALOXONE HCL 0.4 MG/ML
0.2 VIAL (ML) INJECTION AS NEEDED
Status: DISCONTINUED | OUTPATIENT
Start: 2020-08-14 | End: 2020-08-14 | Stop reason: HOSPADM

## 2020-08-14 RX ORDER — FLUMAZENIL 0.1 MG/ML
0.2 INJECTION INTRAVENOUS AS NEEDED
Status: DISCONTINUED | OUTPATIENT
Start: 2020-08-14 | End: 2020-08-14 | Stop reason: HOSPADM

## 2020-08-14 RX ORDER — PROPOFOL 10 MG/ML
VIAL (ML) INTRAVENOUS AS NEEDED
Status: DISCONTINUED | OUTPATIENT
Start: 2020-08-14 | End: 2020-08-14 | Stop reason: SURG

## 2020-08-14 RX ORDER — SODIUM CHLORIDE, SODIUM LACTATE, POTASSIUM CHLORIDE, CALCIUM CHLORIDE 600; 310; 30; 20 MG/100ML; MG/100ML; MG/100ML; MG/100ML
100 INJECTION, SOLUTION INTRAVENOUS CONTINUOUS
Status: DISCONTINUED | OUTPATIENT
Start: 2020-08-14 | End: 2020-08-14 | Stop reason: HOSPADM

## 2020-08-14 RX ORDER — LIDOCAINE HYDROCHLORIDE 20 MG/ML
INJECTION, SOLUTION INFILTRATION; PERINEURAL AS NEEDED
Status: DISCONTINUED | OUTPATIENT
Start: 2020-08-14 | End: 2020-08-14 | Stop reason: SURG

## 2020-08-14 RX ORDER — DEXAMETHASONE SODIUM PHOSPHATE 10 MG/ML
INJECTION INTRAMUSCULAR; INTRAVENOUS AS NEEDED
Status: DISCONTINUED | OUTPATIENT
Start: 2020-08-14 | End: 2020-08-14 | Stop reason: SURG

## 2020-08-14 RX ORDER — ACETAMINOPHEN 325 MG/1
650 TABLET ORAL ONCE AS NEEDED
Status: DISCONTINUED | OUTPATIENT
Start: 2020-08-14 | End: 2020-08-14 | Stop reason: HOSPADM

## 2020-08-14 RX ORDER — SODIUM CHLORIDE 0.9 % (FLUSH) 0.9 %
3-10 SYRINGE (ML) INJECTION AS NEEDED
Status: DISCONTINUED | OUTPATIENT
Start: 2020-08-14 | End: 2020-08-14 | Stop reason: HOSPADM

## 2020-08-14 RX ORDER — LIDOCAINE HYDROCHLORIDE 10 MG/ML
0.5 INJECTION, SOLUTION EPIDURAL; INFILTRATION; INTRACAUDAL; PERINEURAL ONCE AS NEEDED
Status: DISCONTINUED | OUTPATIENT
Start: 2020-08-14 | End: 2020-08-14 | Stop reason: HOSPADM

## 2020-08-14 RX ORDER — FENTANYL CITRATE 50 UG/ML
50 INJECTION, SOLUTION INTRAMUSCULAR; INTRAVENOUS
Status: DISCONTINUED | OUTPATIENT
Start: 2020-08-14 | End: 2020-08-14 | Stop reason: HOSPADM

## 2020-08-14 RX ORDER — LABETALOL HYDROCHLORIDE 5 MG/ML
5 INJECTION, SOLUTION INTRAVENOUS
Status: DISCONTINUED | OUTPATIENT
Start: 2020-08-14 | End: 2020-08-14 | Stop reason: HOSPADM

## 2020-08-14 RX ORDER — HYDROMORPHONE HYDROCHLORIDE 1 MG/ML
0.5 INJECTION, SOLUTION INTRAMUSCULAR; INTRAVENOUS; SUBCUTANEOUS
Status: DISCONTINUED | OUTPATIENT
Start: 2020-08-14 | End: 2020-08-14 | Stop reason: HOSPADM

## 2020-08-14 RX ORDER — FAMOTIDINE 10 MG/ML
20 INJECTION, SOLUTION INTRAVENOUS ONCE
Status: COMPLETED | OUTPATIENT
Start: 2020-08-14 | End: 2020-08-14

## 2020-08-14 RX ORDER — SODIUM CHLORIDE 0.9 % (FLUSH) 0.9 %
3 SYRINGE (ML) INJECTION EVERY 12 HOURS SCHEDULED
Status: DISCONTINUED | OUTPATIENT
Start: 2020-08-14 | End: 2020-08-14 | Stop reason: HOSPADM

## 2020-08-14 RX ORDER — HYDROCODONE BITARTRATE AND ACETAMINOPHEN 7.5; 325 MG/1; MG/1
1 TABLET ORAL ONCE AS NEEDED
Status: COMPLETED | OUTPATIENT
Start: 2020-08-14 | End: 2020-08-14

## 2020-08-14 RX ORDER — PROMETHAZINE HYDROCHLORIDE 25 MG/1
25 TABLET ORAL ONCE AS NEEDED
Status: DISCONTINUED | OUTPATIENT
Start: 2020-08-14 | End: 2020-08-14 | Stop reason: HOSPADM

## 2020-08-14 RX ORDER — FENTANYL CITRATE 50 UG/ML
INJECTION, SOLUTION INTRAMUSCULAR; INTRAVENOUS AS NEEDED
Status: DISCONTINUED | OUTPATIENT
Start: 2020-08-14 | End: 2020-08-14 | Stop reason: SURG

## 2020-08-14 RX ORDER — HYDROMORPHONE HCL 110MG/55ML
PATIENT CONTROLLED ANALGESIA SYRINGE INTRAVENOUS AS NEEDED
Status: DISCONTINUED | OUTPATIENT
Start: 2020-08-14 | End: 2020-08-14 | Stop reason: SURG

## 2020-08-14 RX ORDER — MIDAZOLAM HYDROCHLORIDE 1 MG/ML
1 INJECTION INTRAMUSCULAR; INTRAVENOUS
Status: COMPLETED | OUTPATIENT
Start: 2020-08-14 | End: 2020-08-14

## 2020-08-14 RX ORDER — CEPHALEXIN 500 MG/1
500 CAPSULE ORAL 4 TIMES DAILY
Qty: 20 CAPSULE | Refills: 0 | Status: SHIPPED | OUTPATIENT
Start: 2020-08-14 | End: 2020-08-19

## 2020-08-14 RX ORDER — ONDANSETRON 2 MG/ML
INJECTION INTRAMUSCULAR; INTRAVENOUS AS NEEDED
Status: DISCONTINUED | OUTPATIENT
Start: 2020-08-14 | End: 2020-08-14 | Stop reason: SURG

## 2020-08-14 RX ORDER — CEFAZOLIN SODIUM 2 G/100ML
2 INJECTION, SOLUTION INTRAVENOUS ONCE
Status: COMPLETED | OUTPATIENT
Start: 2020-08-14 | End: 2020-08-14

## 2020-08-14 RX ORDER — PROMETHAZINE HYDROCHLORIDE 25 MG/1
25 SUPPOSITORY RECTAL ONCE AS NEEDED
Status: DISCONTINUED | OUTPATIENT
Start: 2020-08-14 | End: 2020-08-14 | Stop reason: HOSPADM

## 2020-08-14 RX ORDER — OXYCODONE AND ACETAMINOPHEN 7.5; 325 MG/1; MG/1
1 TABLET ORAL ONCE AS NEEDED
Status: DISCONTINUED | OUTPATIENT
Start: 2020-08-14 | End: 2020-08-14 | Stop reason: HOSPADM

## 2020-08-14 RX ORDER — ALBUTEROL SULFATE 2.5 MG/3ML
2.5 SOLUTION RESPIRATORY (INHALATION) ONCE AS NEEDED
Status: DISCONTINUED | OUTPATIENT
Start: 2020-08-14 | End: 2020-08-14 | Stop reason: HOSPADM

## 2020-08-14 RX ORDER — EPHEDRINE SULFATE 50 MG/ML
5 INJECTION, SOLUTION INTRAVENOUS ONCE AS NEEDED
Status: DISCONTINUED | OUTPATIENT
Start: 2020-08-14 | End: 2020-08-14 | Stop reason: HOSPADM

## 2020-08-14 RX ORDER — DIPHENHYDRAMINE HYDROCHLORIDE 50 MG/ML
12.5 INJECTION INTRAMUSCULAR; INTRAVENOUS
Status: DISCONTINUED | OUTPATIENT
Start: 2020-08-14 | End: 2020-08-14 | Stop reason: HOSPADM

## 2020-08-14 RX ORDER — DIPHENHYDRAMINE HCL 25 MG
25 CAPSULE ORAL
Status: DISCONTINUED | OUTPATIENT
Start: 2020-08-14 | End: 2020-08-14 | Stop reason: HOSPADM

## 2020-08-14 RX ORDER — SODIUM CHLORIDE, SODIUM LACTATE, POTASSIUM CHLORIDE, CALCIUM CHLORIDE 600; 310; 30; 20 MG/100ML; MG/100ML; MG/100ML; MG/100ML
9 INJECTION, SOLUTION INTRAVENOUS CONTINUOUS
Status: DISCONTINUED | OUTPATIENT
Start: 2020-08-14 | End: 2020-08-14 | Stop reason: HOSPADM

## 2020-08-14 RX ORDER — ROCURONIUM BROMIDE 10 MG/ML
INJECTION, SOLUTION INTRAVENOUS AS NEEDED
Status: DISCONTINUED | OUTPATIENT
Start: 2020-08-14 | End: 2020-08-14 | Stop reason: SURG

## 2020-08-14 RX ORDER — PROMETHAZINE HYDROCHLORIDE 25 MG/ML
6.25 INJECTION, SOLUTION INTRAMUSCULAR; INTRAVENOUS
Status: DISCONTINUED | OUTPATIENT
Start: 2020-08-14 | End: 2020-08-14 | Stop reason: HOSPADM

## 2020-08-14 RX ORDER — MAGNESIUM HYDROXIDE 1200 MG/15ML
LIQUID ORAL AS NEEDED
Status: DISCONTINUED | OUTPATIENT
Start: 2020-08-14 | End: 2020-08-14 | Stop reason: HOSPADM

## 2020-08-14 RX ORDER — HYDROCODONE BITARTRATE AND ACETAMINOPHEN 5; 325 MG/1; MG/1
1 TABLET ORAL EVERY 4 HOURS PRN
Qty: 35 TABLET | Refills: 0 | Status: SHIPPED | OUTPATIENT
Start: 2020-08-14 | End: 2020-08-26 | Stop reason: SDUPTHER

## 2020-08-14 RX ORDER — HYDRALAZINE HYDROCHLORIDE 20 MG/ML
5 INJECTION INTRAMUSCULAR; INTRAVENOUS
Status: DISCONTINUED | OUTPATIENT
Start: 2020-08-14 | End: 2020-08-14 | Stop reason: HOSPADM

## 2020-08-14 RX ADMIN — SODIUM CHLORIDE, POTASSIUM CHLORIDE, SODIUM LACTATE AND CALCIUM CHLORIDE: 600; 310; 30; 20 INJECTION, SOLUTION INTRAVENOUS at 08:51

## 2020-08-14 RX ADMIN — FAMOTIDINE 20 MG: 10 INJECTION INTRAVENOUS at 06:27

## 2020-08-14 RX ADMIN — LIDOCAINE HYDROCHLORIDE 100 MG: 20 INJECTION, SOLUTION INFILTRATION; PERINEURAL at 07:38

## 2020-08-14 RX ADMIN — PROPOFOL 250 MG: 10 INJECTION, EMULSION INTRAVENOUS at 07:38

## 2020-08-14 RX ADMIN — FENTANYL CITRATE 100 MCG: 50 INJECTION INTRAMUSCULAR; INTRAVENOUS at 07:38

## 2020-08-14 RX ADMIN — CEFAZOLIN SODIUM 2 G: 2 INJECTION, SOLUTION INTRAVENOUS at 07:30

## 2020-08-14 RX ADMIN — GLYCOPYRROLATE 0.6 MG: 0.2 INJECTION INTRAMUSCULAR; INTRAVENOUS at 08:49

## 2020-08-14 RX ADMIN — NEOSTIGMINE METHYLSULFATE 4 MG: 1 INJECTION INTRAMUSCULAR; INTRAVENOUS; SUBCUTANEOUS at 08:49

## 2020-08-14 RX ADMIN — ONDANSETRON HYDROCHLORIDE 4 MG: 2 SOLUTION INTRAMUSCULAR; INTRAVENOUS at 08:49

## 2020-08-14 RX ADMIN — SODIUM CHLORIDE, POTASSIUM CHLORIDE, SODIUM LACTATE AND CALCIUM CHLORIDE 9 ML/HR: 600; 310; 30; 20 INJECTION, SOLUTION INTRAVENOUS at 06:27

## 2020-08-14 RX ADMIN — FENTANYL CITRATE 50 MCG: 50 INJECTION, SOLUTION INTRAMUSCULAR; INTRAVENOUS at 09:31

## 2020-08-14 RX ADMIN — ROCURONIUM BROMIDE 50 MG: 10 INJECTION INTRAVENOUS at 07:38

## 2020-08-14 RX ADMIN — ONDANSETRON 4 MG: 2 INJECTION INTRAMUSCULAR; INTRAVENOUS at 09:17

## 2020-08-14 RX ADMIN — HYDROMORPHONE HYDROCHLORIDE 0.5 MG: 1 INJECTION, SOLUTION INTRAMUSCULAR; INTRAVENOUS; SUBCUTANEOUS at 10:03

## 2020-08-14 RX ADMIN — MIDAZOLAM 1 MG: 1 INJECTION INTRAMUSCULAR; INTRAVENOUS at 06:27

## 2020-08-14 RX ADMIN — HYDROMORPHONE HYDROCHLORIDE 0.5 MG: 2 INJECTION, SOLUTION INTRAMUSCULAR; INTRAVENOUS; SUBCUTANEOUS at 09:01

## 2020-08-14 RX ADMIN — HYDROMORPHONE HYDROCHLORIDE 0.5 MG: 2 INJECTION, SOLUTION INTRAMUSCULAR; INTRAVENOUS; SUBCUTANEOUS at 08:49

## 2020-08-14 RX ADMIN — HYDROCODONE BITARTRATE AND ACETAMINOPHEN 1 TABLET: 7.5; 325 TABLET ORAL at 10:15

## 2020-08-14 RX ADMIN — MIDAZOLAM 1 MG: 1 INJECTION INTRAMUSCULAR; INTRAVENOUS at 06:51

## 2020-08-14 RX ADMIN — DEXAMETHASONE SODIUM PHOSPHATE 8 MG: 10 INJECTION INTRAMUSCULAR; INTRAVENOUS at 07:56

## 2020-08-14 NOTE — OP NOTE
Preop diagnosis: Central and right-sided disc herniation L4-5 with lumbar radiculopathy    Postop diagnosis: same    Procedure performed: Right L4-5 laminectomy, medial facetectomy, foraminotomies and discectomy using microsurgical technique microsurgical instrumentation    Surgeon: Guy Allen M.D.    Assistant: Juany Rich CFA who was instrumental in helping with visualization of neural structures, hemostasis, and retraction of neural structures.  Her skilled assistance was necessary for the success of this case    Indications for the procedure:  This is a patient with severe pain in the legs.  Previous imaging had shown neural compression at the L4-5 levels.  As a result of this and the failure of conservative therapy the patient elected to proceed with surgery.    Operative summary:  After induction of general anesthesia the patient was intubated and placed on the operating table in the prone position on a Gilles table.  All pressure points were padded including peripheral points of entrapment.  The back was prepped with Chloraprep and then draped with Ioban, half sheets, and a split sheet.      The L4-5 level was localized with intraoperative fluoroscopy an incision was made on the right just above the pedicle.  Successive dilating tubes up to 18 mm by 6 cm were placed over that area.  Soft tissue was then removed from the supralaminar space.  The inferior laminar arch of L4 as well as the superior laminar arch of L5 and the medial aspect of facet were removed with the MaxLinear drill.  The remainder of the operation was done under high-power magnification of the operating microscope using microsurgical technique and microsurgical instrumentation.  The ligamentum flavum was opened and removed out to the level of the pedicle using the Kerrison rongeurs.  This exposed the lateral thecal sac and the nerve root of L5.  Once the lateral recess was opened the dissection was carried up to the L4 pedicle  completely decompressing the superior nerve root.    Once this was done the L5 nerve root was mobilized medially to expose the disc.  There was evidence of a disc herniation compressing the nerve just under the nerve root.  This was carefully teased out and then the disc space was incised and disc material was removed with the down-biting cervical curettes and the pituitary rongeurs.  Once the disc space was emptied as much as possible bleeding was controlled with bipolar cautery.    Once the entire decompression was completed we were able to explore under the nerve root and the thecal sac using the Perez ball probe to be sure there was no evidence of residual compression.there being none bleeding was controlled again using the bipolar cautery, FloSeal, and thrombin and Gelfoam.  The area was then copiously irrigated with vancomycin solution and the tube was removed. The paraspinous musculature was injected with 100 cc 1/8% Marcaine with 1:200,000 epinephrine solution.    Another gram of Kefzol was given prior to closure.    The incision was then closed in layersdressed and the patient was taken to the recovery room in stable condition there were no apparent complications.  Sponge, instrument, and needle counts were correct at the end of the procedure.

## 2020-08-14 NOTE — BRIEF OP NOTE
LUMBAR DISCECTOMY POSTERIOR WITH METRIX  Progress Note    Poppy EATSMAN Carvalho  8/14/2020    Pre-op Diagnosis:   Neuritis or radiculitis due to rupture of lumbar intervertebral disc [M51.16]       Post-Op Diagnosis Codes:     * Neuritis or radiculitis due to rupture of lumbar intervertebral disc [M51.16]    Procedure/CPT® Codes:        Procedure(s):  Right lumbar 4 lumbar 5 laminectomy and discectomy with metrx    Surgeon(s):  Guy Allen MD    Anesthesia: General    Staff:   Circulator: Alexsandra Croft RN; Raquel Worrell RN  Scrub Person: Sarah Valdivia  Assistant: Juany Rich CSA  Assistant: Juany Rich CSA      Estimated Blood Loss: minimal    Urine Voided: * No values recorded between 8/14/2020  7:29 AM and 8/14/2020  8:54 AM *    Specimens:                None          Drains: * No LDAs found *    Findings: Herniated disc    Complications: None    Guy Allen MD     Date: 8/14/2020  Time: 08:54

## 2020-08-14 NOTE — DISCHARGE INSTRUCTIONS
YOUR LAST PAIN PILL WAS GIVEN AT 10:15 THIS MORNING.        LUMBAR SURGERY - JANELLE IBRAHIM M.D.  [address], [address]  [place] [address]  [phone]    Instructions & Care After Your Lumbar Surgery    1. No sitting except on the commode.  You may lie on a firm couch but not on a waterbed or recliner.  You may lie in any position that is comfortable, using only one pillow under your head. Either stand at a counter or lie on your side for meals. Three weeks after surgery you may begin sitting for 30 minutes 3 times per day.    2. No driving for three weeks.  You may ride in the car in a passenger seat that reclines or lying down in the back seat.      3. No bending. If you drop something allow someone else to pick it up.    4. Don’t lift anything heavier than a coffee cup or paperback book.    5. Gradually increase your activity each day.  You should get out of bed every hour during the day.  Walk outside as soon as you feel up to it.  Walk short distances frequently rather than making a long trip.  Frequency is more important than distance.  Your goal is to be walking 2 to 3 miles per day when you return for your post-operative visit. (Never do this in one trip.)    6. You may climb stairs.    7. Remove your bandage the second day after surgery and leave it off.  If you notice any redness, swelling or drainage, call the office.  There are steri-strips across the incision.  If these are still present ten days after surgery, remove them gently.      8. You may shower five days after surgery.  Keep the incision dry until then.  Don’t let the water beat directly on the incision and gently pat it dry.    9. Physical Therapy will be arranged at your post-operative visit if needed.    10. Your prescription for pain medication may be filled for half the original amount prior to your return office visit.  Due to changes in Federal Law in order to have this medication refilled you must contact the office four days  prior to the date and make arrangements to pick the prescription up in the office.  This prescription refill cannot be called in to the pharmacy. Your prescription will be ready for pick-up the day the refill is due.    Don’t be alarmed if you experience some of your pre-operative symptoms after going home.  This is not uncommon and normally goes away in a few days but may last longer.  If you have any questions or concerns, please call our office.

## 2020-08-14 NOTE — ANESTHESIA PREPROCEDURE EVALUATION
Anesthesia Evaluation     Patient summary reviewed and Nursing notes reviewed   NPO Solid Status: > 8 hours  NPO Liquid Status: > 2 hours           Airway   Mallampati: II  TM distance: >3 FB  Neck ROM: full  Dental - normal exam     Pulmonary - normal exam   (+) a smoker Current,   Cardiovascular - normal exam        Neuro/Psych  (+) numbness, psychiatric history Anxiety, Depression and Bipolar,     GI/Hepatic/Renal/Endo    (+) obesity,  GERD,      Musculoskeletal     Abdominal    Substance History      OB/GYN          Other   arthritis,                      Anesthesia Plan    ASA 3     general       Anesthetic plan, all risks, benefits, and alternatives have been provided, discussed and informed consent has been obtained with: patient.

## 2020-08-14 NOTE — ANESTHESIA POSTPROCEDURE EVALUATION
Patient: Poppy Carvalho    Procedure Summary     Date:  08/14/20 Room / Location:  [facility] OR [address] [place] [address] MAIN OR    Anesthesia Start:  0729 Anesthesia Stop:  0911    Procedure:  Right lumbar 4 lumbar 5 laminectomy and discectomy with metrx (Right Spine Lumbar) Diagnosis:       Neuritis or radiculitis due to rupture of lumbar intervertebral disc      (Neuritis or radiculitis due to rupture of lumbar intervertebral disc [M51.16])    Surgeon:  Guy Allen MD Provider:  Celestino Rosa MD    Anesthesia Type:  general ASA Status:  3          Anesthesia Type: general    Vitals  Vitals Value Taken Time   /63 8/14/2020 10:35 AM   Temp 36.7 °C (98 °F) 8/14/2020 10:35 AM   Pulse 73 8/14/2020 10:35 AM   Resp 16 8/14/2020 10:35 AM   SpO2 97 % 8/14/2020 10:43 AM   Vitals shown include unvalidated device data.        Post Anesthesia Care and Evaluation    Patient location during evaluation: bedside  Patient participation: complete - patient participated  Level of consciousness: awake  Pain score: 2  Pain management: adequate  Airway patency: patent  Anesthetic complications: No anesthetic complications  PONV Status: none  Cardiovascular status: acceptable  Respiratory status: acceptable  Hydration status: acceptable    Comments: /63 (BP Location: Right arm, Patient Position: Lying)   Pulse 73   Temp 36.7 °C (98 °F)   Resp 16   Wt 108 kg (237 lb 14.4 oz)   SpO2 98%   BMI 33.18 kg/m²

## 2020-08-14 NOTE — ANESTHESIA PROCEDURE NOTES
Airway  Urgency: elective    Date/Time: 8/14/2020 7:39 AM  Airway not difficult    General Information and Staff    Patient location during procedure: OR  Anesthesiologist: Celestino Rosa MD  CRNA: Argenis Lind CRNA    Indications and Patient Condition  Indications for airway management: airway protection    Preoxygenated: yes  MILS not maintained throughout  Mask difficulty assessment: 1 - vent by mask    Final Airway Details  Final airway type: endotracheal airway      Successful airway: ETT  Cuffed: yes   Successful intubation technique: direct laryngoscopy  Facilitating devices/methods: intubating stylet  Endotracheal tube insertion site: oral  Blade: Saulo  Blade size: 3  ETT size (mm): 7.0  Cormack-Lehane Classification: grade I - full view of glottis  Placement verified by: chest auscultation   Cuff volume (mL): 8  Measured from: lips  Number of attempts at approach: 1  Assessment: lips, teeth, and gum same as pre-op and atraumatic intubation    Additional Comments  PreO2 100% face mask, IV induction, easy mask, DVL x1, cords noted, tube through, cuff up, EBBSH, +etCO2, = chest movement, tube secured in place, atraumatic, teeth and lips intact as preop.

## 2020-08-19 ENCOUNTER — TELEPHONE (OUTPATIENT)
Dept: NEUROSURGERY | Facility: CLINIC | Age: 25
End: 2020-08-19

## 2020-08-26 DIAGNOSIS — M51.16 NEURITIS OR RADICULITIS DUE TO RUPTURE OF LUMBAR INTERVERTEBRAL DISC: ICD-10-CM

## 2020-08-26 RX ORDER — HYDROCODONE BITARTRATE AND ACETAMINOPHEN 5; 325 MG/1; MG/1
1 TABLET ORAL EVERY 6 HOURS PRN
Qty: 20 TABLET | Refills: 0 | Status: SHIPPED | OUTPATIENT
Start: 2020-08-26 | End: 2021-10-04

## 2020-08-26 NOTE — TELEPHONE ENCOUNTER
Patient called and requested her 2nd refill of pain medication. She had Right L4-5 laminectomy, medial facetectomy, foraminotomies and discectomy using microsurgical technique microsurgical instrumentation done on 8/14/2020. I explained the 2nd refill will be for #20 pills as she should start weaning off. She verbalized understanding.

## 2020-09-01 ENCOUNTER — OFFICE VISIT (OUTPATIENT)
Dept: NEUROSURGERY | Facility: CLINIC | Age: 25
End: 2020-09-01

## 2020-09-01 DIAGNOSIS — Z09 FOLLOW-UP EXAMINATION FOLLOWING SURGERY: Primary | ICD-10-CM

## 2020-09-01 PROCEDURE — 99024 POSTOP FOLLOW-UP VISIT: CPT | Performed by: NEUROLOGICAL SURGERY

## 2020-09-15 ENCOUNTER — HOSPITAL ENCOUNTER (OUTPATIENT)
Dept: PHYSICAL THERAPY | Facility: HOSPITAL | Age: 25
Setting detail: THERAPIES SERIES
Discharge: HOME | End: 2020-09-15

## 2020-09-15 DIAGNOSIS — Z47.89 ORTHOPEDIC AFTERCARE: ICD-10-CM

## 2020-09-15 DIAGNOSIS — M54.50 ACUTE BILATERAL LOW BACK PAIN WITHOUT SCIATICA: Primary | ICD-10-CM

## 2020-09-15 DIAGNOSIS — R26.2 DIFFICULTY WALKING: ICD-10-CM

## 2020-09-15 PROCEDURE — 97162 PT EVAL MOD COMPLEX 30 MIN: CPT

## 2020-09-15 PROCEDURE — 97110 THERAPEUTIC EXERCISES: CPT

## 2020-09-15 NOTE — THERAPY EVALUATION
Outpatient Physical Therapy Ortho Initial Evaluation  [facility]     Patient Name: Poppy Carvalho  : 1995  MRN: [id number]  Today's Date: 9/15/2020      Visit Date: 09/15/2020    Patient Active Problem List   Diagnosis   • Thoracic disc herniation   • Chronic back pain   • Neurogenic bladder   • Anxiety   • History of back surgery   • Bladder incontinence   • Neuritis or radiculitis due to rupture of lumbar intervertebral disc        Past Medical History:   Diagnosis Date   • Anesthesia complication     HARD TO SEDATE   • Anxiety    • Bipolar 1 disorder (CMS/HCC)    • DDD (degenerative disc disease), lumbar    • Depression    • GERD (gastroesophageal reflux disease)    • Hx of migraines    • Neurogenic bladder    • Urinary retention         Past Surgical History:   Procedure Laterality Date   • KNEE SURGERY     • LUMBAR DISCECTOMY Right 2020    Procedure: Right lumbar 4 lumbar 5 laminectomy and discectomy with metrx;  Surgeon: Guy Allen MD;  Location: [facility]  Service: Neurosurgery;  Laterality: Right;   • LUMBAR LAMINECTOMY FOR TETHERED CORD RELEASE         Visit Dx:     ICD-10-CM ICD-9-CM   1. Acute bilateral low back pain without sciatica  M54.5 724.2     338.19   2. Orthopedic aftercare  Z47.89 V54.9   3. Difficulty walking  R26.2 719.7         Patient History     Row Name 09/15/20 1000             History    Chief Complaint  Pain;Muscle weakness  -RS      Type of Pain  Back pain  -RS      Date Current Problem(s) Began  20  -RS      Brief Description of Current Complaint  The pt is a 24 yo female who presents s/p L4/5 laminectomy, medial facetectomy, foraminotomies and discectomy on . She has a history of chronic low back pain and pain into her RLE. She has had pain since she was little (around 9 years old) and has tried PT and injections, she lost control of her bladder so she went to the ED and had MRI/ underwent surgery. She repots also having hip pain and is  seeking an opinion from ortho MD for hip, does not have scheduled yet. She has pain into her RLE, specifically the back of her leg and into the top of her foot. She feels as though her leg is being stretch and like it might explode. She lives at home with her parents, does not work and plans not to return for a while due to pain and function. Has 2 small children and is having trouble trouble caring for them. She has much difficulty sleeping, wakes every hour. She has trouble navigating stairs, walking on even ground more than 10 min, sitting for long periods, must adjust a lot. Had PT previously for hip pain and was told her hips aren't even.  -RS      Patient/Caregiver Goals  Relieve pain;Return to prior level of function;Know what to do to help the symptoms;Improve strength  -RS      Hand Dominance  right-handed  -RS      What clinical tests have you had for this problem?  MRI;X-ray  -RS      Results of Clinical Tests  see chart  -RS         Pain     Pain Location  Back  -RS      Pain at Present  7  -RS      Pain at Best  5  -RS      Pain at Worst  9  -RS      Is your sleep disturbed?  Yes  -RS      Total hours of sleep per night  wakes every hour  -RS      Difficulties at work?  not working  -RS      Difficulties with ADL's?  self care ADLs (dressing, bath), caring for her children  -RS         Fall Risk Assessment    Any falls in the past year:  No  -RS         Services    Are you currently receiving Home Health services  No  -RS         Daily Activities    Primary Language  English  -RS      How does patient learn best?  Listening;Reading;Demonstration  -RS      Pt Participated in POC and Goals  Yes  -RS         Safety    Are you being hurt, hit, or frightened by anyone at home or in your life?  No  -RS      Are you being neglected by a caregiver  No  -RS        User Key  (r) = Recorded By, (t) = Taken By, (c) = Cosigned By    Initials Name Provider Type    RS Manuela Gilbert, PT Physical Therapist           PT Ortho     Row Name 09/15/20 1000       Posture/Observations    Alignment Options  Forward head;Rounded shoulders;Thoracic kyphosis;Lumbar lordosis  -RS    Forward Head  Moderate;Mild  -RS    Thoracic Kyphosis  Mild  -RS    Rounded Shoulders  Moderate  -RS    Lumbar lordosis  Mild;Increased  -RS    Observations  Incision healing noted   -RS    Posture/Observations Comments  small scabbed area at base of incision, no redness surrounding, no drainage noted, no swelling. Advised the pt to monitor the area and if drainage appears, swelling, etc to notify MD  -RS       DTR- Lower Quarter Clearing    Patellar tendon (L2-4)  Bilateral:;2- Normal response  -RS    Achilles tendon (S1-2)  Bilateral:;2- Normal response  -RS       Neural Tension Signs- Lower Quarter Clearing    Slump  Bilateral:;Positive  -RS    SLR  Bilateral:;Positive  -RS       Myotomal Screen- Lower Quarter Clearing    Hip flexion (L2)  Right:;3+ (Fair +);Left:;4 (Good)  -RS    Knee extension (L3)  Right:;4- (Good -);Left:;4+ (Good +)  -RS    Ankle DF (L4)  Right:;4 (Good);Left:;4+ (Good +)  -RS    Ankle PF (S1)  Right:;4 (Good);Left:;4+ (Good +)  -RS    Knee flexion (S2)  Right:;4- (Good -);Left:;4 (Good)  -RS       Lumbar ROM Screen- Lower Quarter Clearing    Lumbar Flexion  Impaired 50% WNL, increased pain in B hips and back  -RS    Lumbar Extension  Impaired pain in neutral  -RS    Lumbar Lateral Flexion  Impaired B approx 50%- increased painR  -RS    Lumbar Rotation  Impaired 40% WNL- stiff/pain  -RS       SI/Hip Screen- Lower Quarter Clearing    Nabeel's/Stu's test  Right:;Bilateral:  -RS    Pain in Onesimo's area  Right:;Positive  -RS       Special Tests/Palpation    Special Tests/Palpation  Lumbar/SI;Hip  -RS       Lumbosacral Accessory Motions    Lumbosacral Accessory Motions Tested?  -- hypomobile throughout, guarding of paraspinals noted  -RS       Lumbosacral Palpation    Lumbosacral Palpation?  Yes  -RS    Piriformis  Right:;Tender   -RS    Quadratus Lumborum  Right:;Bilateral:;Tender;Guarded/taut  -RS    Erector Spinae (Paraspinals)  Right:;Bilateral:;Tender;Guarded/taut  -RS    Iliopsoas  Right:;Tender;Guarded/taut  -RS       Hip Special Tests    FAIR test (piriformis syndrome)  Right:;Positive pain in hip and back  -RS       Sensation    Additional Comments  intact to light touch, feels tightness throughout her leg like it is swelling, no significant edema noted  -RS       Gait/Stairs (Locomotion)    Comment (Gait/Stairs)  Pt demonstrates antalgic gait pattern with RLE, hip hiking R hip, decreased cadecence, decreased hip flexion and extension R hip with R knee held in extension. Pt reports uses a cane when goes out but forgot it today  -RS      User Key  (r) = Recorded By, (t) = Taken By, (c) = Cosigned By    Initials Name Provider Type    RS Manuela Gilbert, PT Physical Therapist                      Therapy Education  Education Details: Role of outpatient PT, POC, differential diagnosis, initial HEP, expectations, goals, anatomy. Access Code: [email or web address]  Given: HEP, Pain management  Program: New  How Provided: Verbal, Demonstration, Written  Provided to: Patient  Level of Understanding: Verbalized, Demonstrated     PT OP Goals     Row Name 09/15/20 1100          PT Short Term Goals    STG Date to Achieve  10/06/20  -RS     STG 1  The pt will demonstrate IND and compliant with initial HEP focused on pain management and improved postural awareness.  -RS     STG 1 Progress  New  -RS     STG 2  The pt will report at least 50% decreased in frequency of waking due to pain for improved restorative sleep pattern (waking no more than 2-3 times per night).  -RS     STG 2 Progress  New  -RS     STG 3  The pt will ambulate with SPC over even ground with near normal gait pattern.  -RS     STG 3 Progress  New  -RS        Long Term Goals    LTG 1  The pt will report at least 60% reduction in RLE neural symptoms for improved functional activity tolerance.   -RS     LTG 1 Progress  New  -RS     LTG 2  The pt will demonstrate IND and compliant with progressive HEP focused on IND condition management and return to PLOF.  -RS     LTG 2 Progress  New  -RS     LTG 3  The pt will tolerate standing/walking for at least 25 with pain no greater than 5/10 for improved childcare performance.  -RS     LTG 3 Progress  New  -RS     LTG 4  The pt will navigate stairs safely with step to pattern ad single HR for improved community navigation.  -RS     LTG 4 Progress  New  -RS     LTG 5  The pt will ambulate with near normal gait pattern without AD over even ground.  -RS     LTG 5 Progress  New  -RS     LTG 6  The pt will score less than or  equal to  60% disability on the FAM to indicate improved perceived perforance of ADLs.  -RS     LTG 6 Progress  New  -RS        Time Calculation    PT Goal Re-Cert Due Date  12/14/20  -RS       User Key  (r) = Recorded By, (t) = Taken By, (c) = Cosigned By    Initials Name Provider Type    RS Manuela Gilbert, PT Physical Therapist          PT Assessment/Plan     Row Name 09/15/20 1000          PT Assessment    Functional Limitations  Decreased safety during functional activities;Impaired gait;Limitation in home management;Limitations in community activities;Limitations in functional capacity and performance;Performance in leisure activities;Performance in self-care ADL;Performance in work activities  -RS     Impairments  Balance;Gait;Endurance;Range of motion;Sensation;Posture;Poor body mechanics;Pain;Muscle strength;Joint mobility  -RS     Assessment Comments  Poppy Carvalho is a 25 y.o. female referred to physical therapy for low back pain s/p lumbar laminectomy 8/14. She presents with an evolving clinical presentation, along with no remarkable comorbidities and personal factors of chronic low back pain, cocurrent R hip pain, current functional level and high pain level that may impact her progress in the plan of care. Pt presents today with  decreased lumbar mobility, decreased R hip mobility specifically into rotation, decreased BLE specifically R, strength, positive neutral tension testing. Incision healing, scab at base of incision, no drainage, no swelling, no redness noted, advised pt to monitor . her signs and symptoms are consistent with referring diagnosis. The previous impairments limit her ability to perform self care ADLs, care for her children, sleep without waking due to pain, walk greater than 10 min, drive, navigate stairs. The pt self scores 82% disability on the FAM (100=full disability).Pt will benefit from skilled PT to address the previous impairments and return to PLOF.  -RS     Please refer to paper survey for additional self-reported information  Yes  -RS     Rehab Potential  Good  -RS     Patient/caregiver participated in establishment of treatment plan and goals  Yes  -RS     Patient would benefit from skilled therapy intervention  Yes  -RS        PT Plan    PT Frequency  2x/week  -RS     Predicted Duration of Therapy Intervention (OT)  8-10 weeks  -RS     Planned CPT's?  PT EVAL MOD COMPLELITY: 30020;PT RE-EVAL: 10557;PT THER PROC EA 15 MIN: 66109;PT THER ACT EA 15 MIN: 90530;PT MANUAL THERAPY EA 15 MIN: 63594;PT GAIT TRAINING EA 15 MIN: 12265;PT NEUROMUSC RE-EDUCATION EA 15 MIN: 75603;PT AQUATIC THERAPY EA 15 MIN: 44708;PT SELF CARE/HOME MGMT/TRAIN EA 15: 66847;PT EVAL AQUA: 00607;PT HOT OR COLD PACK TREAT MCARE;PT ELECTRICAL STIM UNATTEND: ;PT ULTRASOUND EA 15 MIN: 98423;PT TRACTION LUMBAR: 42405;PT HOT/COLD PACK WC NONMCARE: 54447  -RS     PT Plan Comments  Assess tolerance for initial HEP, progress lumbopelvic mobility and stability activities as able. Plan to transition to aquatic environment once incision is healed and cleared by MD if pt status is appropriate. COnsider Nustep, glute set, sciatic nn glide, hip AD  -RS       User Key  (r) = Recorded By, (t) = Taken By, (c) = Cosigned By    Initials Name Provider Type     RS Manuela Gilbert, PT Physical Therapist            OP Exercises     Row Name 09/15/20 1000             Total Minutes    31417 - PT Therapeutic Exercise Minutes  8  -RS         Exercise 1    Exercise Name 1  LTR 11-1  -RS      Cueing 1  Verbal;Demo  -RS      Reps 1  10  -RS      Time 1  3s  -RS         Exercise 2    Exercise Name 2  HL clamshell with TB  -RS      Cueing 2  Verbal;Demo  -RS      Reps 2  10  -RS      Additional Comments  YTB  -RS         Exercise 3    Exercise Name 3  PPT with breathing  -RS      Cueing 3  Verbal;Demo  -RS      Reps 3  10  -RS      Time 3  5s  -RS        User Key  (r) = Recorded By, (t) = Taken By, (c) = Cosigned By    Initials Name Provider Type    RS Manuela Gilbert, PT Physical Therapist                        Outcome Measure Options: Modifed Owestry  Modified Oswestry  Modified Oswestry Score/Comments: 82% disability      Time Calculation:     Start Time: 1000  Stop Time: 1040  Time Calculation (min): 40 min     Therapy Charges for Today     Code Description Service Date Service Provider Modifiers Qty    80975194351 HC PT THER PROC EA 15 MIN 9/15/2020 Manuela Gilbert, PT GP 1    92444892396 HC PT EVAL MOD COMPLEXITY 2 9/15/2020 Manuela Gilbert, PT GP 1          PT G-Codes  Outcome Measure Options: Modifed Owestry  Modified Oswestry Score/Comments: 82% disability         Manuela Gilbert PT  9/15/2020

## 2020-09-25 ENCOUNTER — HOSPITAL ENCOUNTER (OUTPATIENT)
Dept: PHYSICAL THERAPY | Facility: HOSPITAL | Age: 25
Setting detail: THERAPIES SERIES
Discharge: HOME | End: 2020-09-25

## 2020-09-25 DIAGNOSIS — Z47.89 ORTHOPEDIC AFTERCARE: ICD-10-CM

## 2020-09-25 DIAGNOSIS — M54.50 ACUTE BILATERAL LOW BACK PAIN WITHOUT SCIATICA: Primary | ICD-10-CM

## 2020-09-25 DIAGNOSIS — R26.2 DIFFICULTY WALKING: ICD-10-CM

## 2020-09-25 PROCEDURE — 97110 THERAPEUTIC EXERCISES: CPT | Performed by: PHYSICAL THERAPIST

## 2020-09-25 NOTE — THERAPY TREATMENT NOTE
Outpatient Physical Therapy Ortho Treatment Note  [facility]     Patient Name: Poppy Carvalho  : 1995  MRN: [id number]  Today's Date: 2020      Visit Date: 2020    Visit Dx:    ICD-10-CM ICD-9-CM   1. Acute bilateral low back pain without sciatica  M54.5 724.2     338.19   2. Orthopedic aftercare  Z47.89 V54.9   3. Difficulty walking  R26.2 719.7       Patient Active Problem List   Diagnosis   • Thoracic disc herniation   • Chronic back pain   • Neurogenic bladder   • Anxiety   • History of back surgery   • Bladder incontinence   • Neuritis or radiculitis due to rupture of lumbar intervertebral disc        Past Medical History:   Diagnosis Date   • Anesthesia complication     HARD TO SEDATE   • Anxiety    • Bipolar 1 disorder (CMS/HCC)    • DDD (degenerative disc disease), lumbar    • Depression    • GERD (gastroesophageal reflux disease)    • Hx of migraines    • Neurogenic bladder    • Urinary retention         Past Surgical History:   Procedure Laterality Date   • KNEE SURGERY     • LUMBAR DISCECTOMY Right 2020    Procedure: Right lumbar 4 lumbar 5 laminectomy and discectomy with metrx;  Surgeon: Guy Allen MD;  Location: [facility];  Service: Neurosurgery;  Laterality: Right;   • LUMBAR LAMINECTOMY FOR TETHERED CORD RELEASE                         PT Assessment/Plan     Row Name 20 1221          PT Assessment    Assessment Comments  First session since initial evaluation. Report of continued high levels of pain and pain into R hip. Reviewed initial HEP and added supine stretching and low level core stabilization exercises. Max cueing for proper technique.  -        PT Plan    PT Plan Comments  Review exercises and compliance  -DILLON       User Key  (r) = Recorded By, (t) = Taken By, (c) = Cosigned By    Initials Name Provider Type    Raul Medrano, PT Physical Therapist            OP Exercises     Row Name 20 1000             Subjective Comments     Subjective Comments  I have pain in my R hip/leg today.  -CJ         Subjective Pain    Able to rate subjective pain?  yes  -CJ      Pre-Treatment Pain Level  7  -CJ      Post-Treatment Pain Level  7  -CJ         Total Minutes    03792 - PT Therapeutic Exercise Minutes  40  -CJ         Exercise 1    Exercise Name 1  Nustep, L2  -CJ      Time 1  5 min  -CJ      Additional Comments  with TA  -CJ         Exercise 2    Exercise Name 2  HL clamshell with TB  -CJ      Cueing 2  Verbal;Demo  -CJ      Reps 2  10 B and Uni  -CJ      Additional Comments  YTB  -CJ         Exercise 3    Exercise Name 3  PPT with breathing  -CJ      Cueing 3  Verbal;Demo  -CJ      Reps 3  12  -CJ      Time 3  5s  -CJ         Exercise 4    Exercise Name 4  LTR 11-1, pain minimal range  -CJ      Reps 4  10  -CJ      Time 4  3 sec  -CJ         Exercise 5    Exercise Name 5  SKTC, B  -CJ      Reps 5  2  -CJ      Time 5  30 sec  -CJ         Exercise 6    Exercise Name 6  supine 90/90 HS stretch  -CJ      Reps 6  2  -CJ      Time 6  20  -CJ      Additional Comments  with neural flossing   -CJ         Exercise 7    Exercise Name 7  piriformis/figure 4 stretch  -CJ      Reps 7  2 B  -CJ      Time 7  20sec  -CJ         Exercise 8    Exercise Name 8  H/L hip adduction   -CJ      Reps 8  10  -CJ      Time 8  5 sec  -CJ        User Key  (r) = Recorded By, (t) = Taken By, (c) = Cosigned By    Initials Name Provider Type    Raul Medrano S, PT Physical Therapist                       PT OP Goals     Row Name 09/25/20 1200          PT Short Term Goals    STG Date to Achieve  10/06/20  -     STG 1  The pt will demonstrate IND and compliant with initial HEP focused on pain management and improved postural awareness.  -CJ     STG 1 Progress  Ongoing  -     STG 1 Progress Comments  compliance questionable due to pain  -     STG 2  The pt will report at least 50% decreased in frequency of waking due to pain for improved restorative sleep pattern (waking no  more than 2-3 times per night).  -     STG 2 Progress  Ongoing  -CJ     STG 3  The pt will ambulate with SPC over even ground with near normal gait pattern.  -     STG 3 Progress  Ongoing  -CJ        Long Term Goals    LTG 1  The pt will report at least 60% reduction in RLE neural symptoms for improved functional activity tolerance.  -CJ     LTG 1 Progress  Ongoing  -CJ     LTG 2  The pt will demonstrate IND and compliant with progressive HEP focused on IND condition management and return to PLOF.  -CJ     LTG 2 Progress  Ongoing  -CJ     LTG 3  The pt will tolerate standing/walking for at least 25 with pain no greater than 5/10 for improved childcare performance.  -CJ     LTG 3 Progress  Ongoing  -CJ     LTG 4  The pt will navigate stairs safely with step to pattern ad single HR for improved community navigation.  -CJ     LTG 4 Progress  Ongoing  -CJ     LTG 5  The pt will ambulate with near normal gait pattern without AD over even ground.  -     LTG 5 Progress  Ongoing  -CJ     LTG 6  The pt will score less than or  equal to  60% disability on the FAM to indicate improved perceived perforance of ADLs.  -     LTG 6 Progress  Ongoing  -       User Key  (r) = Recorded By, (t) = Taken By, (c) = Cosigned By    Initials Name Provider Type    Raul Medrano PT Physical Therapist                         Time Calculation:   Start Time: 1045  Stop Time: 1125  Time Calculation (min): 40 min  Total Timed Code Minutes- PT: 40 minute(s)  Therapy Charges for Today     Code Description Service Date Service Provider Modifiers Qty    67704830368 HC PT THER PROC EA 15 MIN 9/25/2020 Raul Coelho PT GP 3                    Raul Coelho PT  9/25/2020

## 2020-09-29 ENCOUNTER — HOSPITAL ENCOUNTER (OUTPATIENT)
Dept: PHYSICAL THERAPY | Facility: HOSPITAL | Age: 25
Setting detail: THERAPIES SERIES
Discharge: HOME | End: 2020-09-29

## 2020-09-29 DIAGNOSIS — M54.50 ACUTE BILATERAL LOW BACK PAIN WITHOUT SCIATICA: Primary | ICD-10-CM

## 2020-09-29 DIAGNOSIS — R26.2 DIFFICULTY WALKING: ICD-10-CM

## 2020-09-29 DIAGNOSIS — Z47.89 ORTHOPEDIC AFTERCARE: ICD-10-CM

## 2020-09-29 PROCEDURE — 97110 THERAPEUTIC EXERCISES: CPT | Performed by: PHYSICAL THERAPIST

## 2020-09-29 NOTE — THERAPY TREATMENT NOTE
Outpatient Physical Therapy Ortho Treatment Note  [facility]     Patient Name: Poppy Carvalho  : 1995  MRN: [id number]  Today's Date: 2020      Visit Date: 2020    Visit Dx:    ICD-10-CM ICD-9-CM   1. Acute bilateral low back pain without sciatica  M54.5 724.2     338.19   2. Orthopedic aftercare  Z47.89 V54.9   3. Difficulty walking  R26.2 719.7       Patient Active Problem List   Diagnosis   • Thoracic disc herniation   • Chronic back pain   • Neurogenic bladder   • Anxiety   • History of back surgery   • Bladder incontinence   • Neuritis or radiculitis due to rupture of lumbar intervertebral disc        Past Medical History:   Diagnosis Date   • Anesthesia complication     HARD TO SEDATE   • Anxiety    • Bipolar 1 disorder (CMS/HCC)    • DDD (degenerative disc disease), lumbar    • Depression    • GERD (gastroesophageal reflux disease)    • Hx of migraines    • Neurogenic bladder    • Urinary retention         Past Surgical History:   Procedure Laterality Date   • KNEE SURGERY     • LUMBAR DISCECTOMY Right 2020    Procedure: Right lumbar 4 lumbar 5 laminectomy and discectomy with metrx;  Surgeon: Guy Allen MD;  Location: [facility];  Service: Neurosurgery;  Laterality: Right;   • LUMBAR LAMINECTOMY FOR TETHERED CORD RELEASE         PT Ortho     Row Name 20 1215       Subjective Pain    Pre-Treatment Pain Level  6  -JS    Post-Treatment Pain Level  6  -JS       Gait/Stairs (Locomotion)    Comment (Gait/Stairs)  Arrives ambulating without assistive device, maintains R knee in extended position throughout gait cycle, R LE in abducted position with decreased weight shifting to R side  -JS      User Key  (r) = Recorded By, (t) = Taken By, (c) = Cosigned By    Initials Name Provider Type    Sissy Jimenez PT Physical Therapist                      PT Assessment/Plan     Row Name 20 1215          PT Assessment    Assessment Comments  Pt with continued LBP and R LE  pain. STG's & LTG's remain ongoing at this time. Pt does report compliance with HEP, though intermittent cueing continues to be necessary for technique therefore no additional exercises added today. Education provided in gait training today.  -JS        PT Plan    PT Plan Comments  Continue PT, including further gait training. Progress strengthening/core stabilization as tolerated, including standing theraband rows & extension with light resistance.  -JS       User Key  (r) = Recorded By, (t) = Taken By, (c) = Cosigned By    Initials Name Provider Type    Sissy Jimenez, PT Physical Therapist            OP Exercises     Row Name 09/29/20 1215             Subjective Comments    Subjective Comments  Reports stiffness in low back & pain R hip.  -JS         Subjective Pain    Able to rate subjective pain?  yes  -JS      Pre-Treatment Pain Level  6  -JS      Post-Treatment Pain Level  6  -JS         Total Minutes    29132 - Gait Training Minutes   5  -JS      44895 - PT Therapeutic Exercise Minutes  40  -JS         Exercise 1    Exercise Name 1  NuStep L3  -JS      Time 1  5 min  -JS      Additional Comments  with TA  -JS         Exercise 2    Exercise Name 2  HL clamshell with TB  -JS      Cueing 2  Verbal;Demo  -JS      Reps 2  10 B and Uni  -JS      Additional Comments  YTB  -JS         Exercise 3    Exercise Name 3  PPT with breathing  -JS      Cueing 3  Verbal;Demo  -JS      Reps 3  12  -JS      Time 3  5s  -JS         Exercise 4    Exercise Name 4  LTR 11-1, pain minimal range  -JS      Cueing 4  Verbal  -JS      Reps 4  10  -JS      Time 4  3 sec  -JS      Additional Comments  Cues to limit ROM to avoid increased pain  -JS         Exercise 5    Exercise Name 5  SKTC, B  -JS      Reps 5  2  -JS      Time 5  30 sec  -JS         Exercise 6    Exercise Name 6  supine 90/90 HS stretch  -JS      Reps 6  2  -JS      Time 6  20  -JS      Additional Comments  with neural flossing   -JS         Exercise 7    Exercise Name 7   piriformis/figure 4 stretch  -JS      Reps 7  2 B  -JS      Time 7  20sec  -JS         Exercise 8    Exercise Name 8  H/L hip adduction   -JS      Cueing 8  Verbal;Demo  -JS      Reps 8  10  -JS      Time 8  5 sec  -JS         Exercise 9    Exercise Name 9  Sidestepping with B UE support at counter  -JS      Reps 9  8'x2 R & L  -JS         Exercise 10    Exercise Name 10  Gait training- focused on weight shifting, core stabilization and cues to flex R knee in swing  -JS      Cueing 10  Verbal;Demo  -JS      Time 10  5 min  -JS        User Key  (r) = Recorded By, (t) = Taken By, (c) = Cosigned By    Initials Name Provider Type    Sissy Jimenez, PT Physical Therapist                       PT OP Goals     Row Name 09/29/20 1215          PT Short Term Goals    STG Date to Achieve  10/06/20  -     STG 1  The pt will demonstrate IND and compliant with initial HEP focused on pain management and improved postural awareness.  -JS     STG 1 Progress  Ongoing  -JS     STG 1 Progress Comments  Reports compliance with HEP, though continues to require intermittent cueing for technique  -JS     STG 2  The pt will report at least 50% decreased in frequency of waking due to pain for improved restorative sleep pattern (waking no more than 2-3 times per night).  -JS     STG 2 Progress  Ongoing  -JS     STG 2 Progress Comments  Reports no change  -JS     STG 3  The pt will ambulate with SPC over even ground with near normal gait pattern.  -JS     STG 3 Progress  Ongoing  -     STG 3 Progress Comments  Ambulates in clinic & for limited distances without AD, though gait abnormalities persist  -        Long Term Goals    LTG 1  The pt will report at least 60% reduction in RLE neural symptoms for improved functional activity tolerance.  -JS     LTG 1 Progress  Ongoing  -     LTG 2  The pt will demonstrate IND and compliant with progressive HEP focused on IND condition management and return to PLOF.  -JS     LTG 2 Progress   Ongoing  -     LTG 3  The pt will tolerate standing/walking for at least 25 with pain no greater than 5/10 for improved childcare performance.  -JS     LTG 3 Progress  Ongoing  -     LTG 4  The pt will navigate stairs safely with step to pattern ad single HR for improved community navigation.  -JS     LTG 4 Progress  Ongoing  -JS     LTG 5  The pt will ambulate with near normal gait pattern without AD over even ground.  -     LTG 5 Progress  Ongoing  -JS     LTG 6  The pt will score less than or  equal to  60% disability on the FAM to indicate improved perceived perforance of ADLs.  -     LTG 6 Progress  Ongoing  -       User Key  (r) = Recorded By, (t) = Taken By, (c) = Cosigned By    Initials Name Provider Type    Sissy Jimenez PT Physical Therapist          Therapy Education  Education Details: Reviewed HEP.  Gait training with focus on upright posture, proper weight shifting.              Time Calculation:   Start Time: 1215  Stop Time: 1300  Time Calculation (min): 45 min  Therapy Charges for Today     Code Description Service Date Service Provider Modifiers Qty    70866597284 HC PT THER PROC EA 15 MIN 9/29/2020 Sissy Valdez PT GP 3                    Sissy Valdez PT  9/29/2020

## 2020-10-02 ENCOUNTER — HOSPITAL ENCOUNTER (OUTPATIENT)
Dept: PHYSICAL THERAPY | Facility: HOSPITAL | Age: 25
Setting detail: THERAPIES SERIES
Discharge: HOME | End: 2020-10-02

## 2020-10-02 DIAGNOSIS — R26.2 DIFFICULTY WALKING: ICD-10-CM

## 2020-10-02 DIAGNOSIS — M54.50 ACUTE BILATERAL LOW BACK PAIN WITHOUT SCIATICA: Primary | ICD-10-CM

## 2020-10-02 DIAGNOSIS — Z47.89 ORTHOPEDIC AFTERCARE: ICD-10-CM

## 2020-10-02 PROCEDURE — 97116 GAIT TRAINING THERAPY: CPT

## 2020-10-02 PROCEDURE — 97110 THERAPEUTIC EXERCISES: CPT

## 2020-10-02 NOTE — THERAPY TREATMENT NOTE
Outpatient Physical Therapy Ortho Treatment Note  [facility]     Patient Name: Poppy Carvalho  : 1995  MRN: [id number]  Today's Date: 10/2/2020      Visit Date: 10/02/2020    Visit Dx:    ICD-10-CM ICD-9-CM   1. Acute bilateral low back pain without sciatica  M54.5 724.2     338.19   2. Orthopedic aftercare  Z47.89 V54.9   3. Difficulty walking  R26.2 719.7       Patient Active Problem List   Diagnosis   • Thoracic disc herniation   • Chronic back pain   • Neurogenic bladder   • Anxiety   • History of back surgery   • Bladder incontinence   • Neuritis or radiculitis due to rupture of lumbar intervertebral disc        Past Medical History:   Diagnosis Date   • Anesthesia complication     HARD TO SEDATE   • Anxiety    • Bipolar 1 disorder (CMS/HCC)    • DDD (degenerative disc disease), lumbar    • Depression    • GERD (gastroesophageal reflux disease)    • Hx of migraines    • Neurogenic bladder    • Urinary retention         Past Surgical History:   Procedure Laterality Date   • KNEE SURGERY     • LUMBAR DISCECTOMY Right 2020    Procedure: Right lumbar 4 lumbar 5 laminectomy and discectomy with metrx;  Surgeon: Guy Allen MD;  Location: [facility];  Service: Neurosurgery;  Laterality: Right;   • LUMBAR LAMINECTOMY FOR TETHERED CORD RELEASE                         PT Assessment/Plan     Row Name 10/02/20 1104          PT Assessment    Assessment Comments  Pt with cont LBP and R hip pain. States she is doing HEP about 1x per day but pain seems to be staying about the same. She was noted to have cont gait abnormality likely contributing to increased pain and was educated on how to improve/using SPC for pain management to prevent cont re-irritation. Used mirror for visual feedback on gait and sidesteps w/ some improvement in mechanics. Was able to add shoulder ext w/ TB for increased core engagement w/o issue. Pt supposed to follow up w/ Dr. Allen on 10/13.  -        PT Plan    PT Plan  Comments  cont gait training, use mirror for visual feedback, progress strength/core stab as tolerated, consider small bridge w/ ppt  -JH       User Key  (r) = Recorded By, (t) = Taken By, (c) = Cosigned By    Initials Name Provider Type    Deborah Regan, PT Physical Therapist            OP Exercises     Row Name 10/02/20 1000             Subjective Comments    Subjective Comments  Pt reports she is sore this morning. Continued tightness in R hip  -JH         Subjective Pain    Subjective Pain Comment  Tried to avoid focus on pain and shift to conversation of function  -JH         Total Minutes    93847 - Gait Training Minutes   10  -JH      85757 - PT Therapeutic Exercise Minutes  35  -JH         Exercise 1    Exercise Name 1  NuStep L3  -JH      Time 1  5 min  -JH      Additional Comments  with TA  -JH         Exercise 2    Exercise Name 2  HL clamshell with TB  -JH      Cueing 2  Verbal;Demo  -JH      Reps 2  10 B and Uni  -JH      Additional Comments  RTB  -JH         Exercise 3    Exercise Name 3  PPT with breathing  -JH      Cueing 3  Verbal;Demo  -JH      Reps 3  12  -JH      Time 3  5s  -JH         Exercise 4    Exercise Name 4  LTR 11-1, pain minimal range  -JH      Cueing 4  Verbal  -JH      Reps 4  10  -JH      Time 4  3 sec  -JH      Additional Comments  Cues to limit ROM to avoid increased pain  -JH         Exercise 5    Exercise Name 5  SKTC, B  -JH      Reps 5  2  -JH      Time 5  30 sec  -JH         Exercise 6    Exercise Name 6  supine 90/90 HS stretch  -JH      Reps 6  2  -JH      Time 6  20  -JH      Additional Comments  with neural flossing   -JH         Exercise 7    Exercise Name 7  piriformis/figure 4 stretch  -JH      Reps 7  2 B  -JH      Time 7  20sec  -JH         Exercise 8    Exercise Name 8  H/L hip adduction   -JH      Cueing 8  Verbal;Demo  -JH      Reps 8  10  -JH      Time 8  5 sec  -JH         Exercise 9    Exercise Name 9  Sidestepping   -JH      Reps 9  2 laps at bar  -JH       Additional Comments  cues to watch posture in mirror to prevent tipping  -         Exercise 10    Exercise Name 10  Gait training- focused on weight shifting, core stabilization and cues to flex R knee in swing  -      Cueing 10  Verbal;Demo  -      Time 10  5 min  -      Additional Comments  mirror for feedback  -         Exercise 11    Exercise Name 11  shoulder ext w/ TB  -      Reps 11  10  -      Time 11  2  -      Additional Comments  RTB-slow controlled engaging abdominals  -        User Key  (r) = Recorded By, (t) = Taken By, (c) = Cosigned By    Initials Name Provider Type     Deborah Chapman, PT Physical Therapist                       PT OP Goals     Row Name 10/02/20 1100          PT Short Term Goals    STG Date to Achieve  10/06/20  -     STG 1  The pt will demonstrate IND and compliant with initial HEP focused on pain management and improved postural awareness.  -     STG 1 Progress  Ongoing  -     STG 2  The pt will report at least 50% decreased in frequency of waking due to pain for improved restorative sleep pattern (waking no more than 2-3 times per night).  -     STG 2 Progress  Ongoing  -     STG 3  The pt will ambulate with SPC over even ground with near normal gait pattern.  -     STG 3 Progress  Ongoing  -     STG 3 Progress Comments  pt ambulating w/o AD w/ noted gait abnormality-improves some with cueing and use of mirror for feedback  -        Long Term Goals    LTG 1  The pt will report at least 60% reduction in RLE neural symptoms for improved functional activity tolerance.  -     LTG 1 Progress  Ongoing  [facility]     LTG 2  The pt will demonstrate IND and compliant with progressive HEP focused on IND condition management and return to PLOF.  -     LTG 2 Progress  Ongoing  [facility]     LTG 3  The pt will tolerate standing/walking for at least 25 with pain no greater than 5/10 for improved childcare performance.  -     LTG 3 Progress  Ongoing  [facility]      LTG 4  The pt will navigate stairs safely with step to pattern ad single HR for improved community navigation.  -     LTG 4 Progress  Ongoing  [facility]  The pt will ambulate with near normal gait pattern without AD over even ground.  [facility]  [facility]  [facility]  The pt will score less than or  equal to  60% disability on the FAM to indicate improved perceived perforance of ADLs.  -[facility] 6 Progress  Ongoing  -       User Key  (r) = Recorded By, (t) = Taken By, (c) = Cosigned By    Initials Name Provider Type     Deborah Chapman PT Physical Therapist          Therapy Education  Education Details: gait training, improving posture with abdominal activation, using mirror for visual feedback  Given: HEP, Pain management  Program: Reinforced  How Provided: Verbal, Demonstration  Provided to: Patient  Level of Understanding: Verbalized, Demonstrated              Time Calculation:   Start Time: 1045  Stop Time: 1130  Time Calculation (min): 45 min  Therapy Charges for Today     Code Description Service Date Service Provider Modifiers Qty    57036376557  PT THER PROC EA 15 MIN 10/2/2020 Deborah Chapman, PT GP 2    [id number]  GAIT TRAINING EA 15 MIN 10/2/2020 Deborah Chapman PT GP 1                    Deborah Chapman PT  10/2/2020

## 2020-10-12 NOTE — PROGRESS NOTES
Subjective   History of Present Illness: Poppy Carvalho is a 25 y.o. female is here today for post op follow up after PT which has not helped. Her physical therapist recommended that she see someone about her right hip due to limited ROM. Her PCP is working on a referral to [facility].  Her PCP has sent her to pain management with Dr Prado and she was seen for the first time on Friday and was prescribed Zanaflex 2 mg hs and Norco 5/325 BID    Patient had surgery 8.14.20 with Dr Allen,   Right L4-5 laminectomy, medial facetectomy, foraminotomies and discectomy using microsurgical technique microsurgical instrumentation.      Patient had televisit 9.1.20 and was doing well with some residual back spasms and lateral thigh spasms    Patient states that she is having increased low back and right hip and leg pain. Her right leg pain is mostly hamstring and gastroc. She also reports N/T right leg and foot and right leg weakness.  She denies urinary incontinence or problems with her balance and gait.  Patient states that she was having some drainage from the end of her incision, however, she has not noticed any for the past week.    Patient says that overall pain is the same as before surgery. She reports not being able to walk for more than 15 minutes and that physical therapy suggested that she use a cane. She states that she is hesitant to do this because she is 25. She said that she had back surgery when she was 17 and feels that she has been struggling with back pain for some time. According to her records, she did have a L5S1 microdiscectomy in 2012.       History of Present Illness    The following portions of the patient's history were reviewed and updated as appropriate: allergies, current medications, past family history, past medical history, past social history, past surgical history and problem list.    Review of Systems   Constitutional: Negative for fever.   HENT: Negative.    Eyes: Negative.   "  Respiratory: Negative.    Cardiovascular: Negative.    Gastrointestinal: Negative.    Endocrine: Negative.    Genitourinary: Negative for urgency.   Musculoskeletal: Positive for back pain. Negative for arthralgias, gait problem, joint swelling and myalgias.   Allergic/Immunologic: Negative.    Neurological: Positive for weakness.   Hematological: Negative.    Psychiatric/Behavioral: Negative.            Objective     Vitals:    10/13/20 0840   BP: 135/85   Pulse: 80   Temp: 97.9 °F (36.6 °C)   TempSrc: Temporal   Weight: 105 kg (232 lb)   Height: 180.3 cm (70.98\")     Body mass index is 32.37 kg/m².      Physical Exam  Pulmonary:      Effort: Pulmonary effort is normal.   Musculoskeletal:      Right shoulder: She exhibits pain.      Lumbar back: She exhibits tenderness and pain.      Right upper leg: She exhibits tenderness.      Right lower leg: She exhibits tenderness.      Comments: Patient reports pain low back that radiates to RLE.   She reports tenderness with palpation on R SI joint    Skin:     General: Skin is warm and dry.      Comments: Lumbar incision site well healed. No erythema, edema, drainage.    Neurological:      Mental Status: She is alert and oriented to person, place, and time.      Gait: Gait is intact.      Deep Tendon Reflexes:      Reflex Scores:       Patellar reflexes are 1+ on the right side and 2+ on the left side.       Achilles reflexes are 1+ on the right side and 1+ on the left side.      Neurologic Exam     Mental Status   Oriented to person, place, and time.   Level of consciousness: alert    Motor Exam     Strength   Right deltoid: 5/5  Left deltoid: 5/5  Right biceps: 5/5  Left biceps: 5/5  Right triceps: 5/5  Left triceps: 5/5  Right iliopsoas: 4/5  Left iliopsoas: 4/5  Right quadriceps: 4/5  Left quadriceps: 4/5  Right hamstrin/5  Left hamstrin/5  Right anterior tibial: 5/5  Left anterior tibial: 5/5  Right gastroc: 5/5  Left gastroc: 5/5    Sensory Exam   Right leg " light touch: normal  Left leg light touch: normal    Gait, Coordination, and Reflexes     Gait  Gait: normal    Reflexes   Right patellar: 1+  Left patellar: 2+  Right achilles: 1+  Left achilles: 1+          Assessment/Plan   Independent Review of Radiographic Studies:      No new imaging.     Medical Decision Making:      I discussed these findings with Dr. Allen. We will order a Thoracic and Lumbar MRI. She will follow up in the office with Dr. Allen.      Diagnoses and all orders for this visit:    1. Chronic right-sided low back pain with right-sided sciatica (Primary)  -     MRI Lumbar Spine With & Without Contrast; Future  -     MRI Thoracic Spine Without Contrast; Future    2. Follow-up examination following surgery      Return in 2 weeks (on 10/27/2020).

## 2020-10-13 ENCOUNTER — OFFICE VISIT (OUTPATIENT)
Dept: NEUROSURGERY | Facility: CLINIC | Age: 25
End: 2020-10-13

## 2020-10-13 VITALS
SYSTOLIC BLOOD PRESSURE: 135 MMHG | DIASTOLIC BLOOD PRESSURE: 85 MMHG | HEART RATE: 80 BPM | WEIGHT: 232 LBS | BODY MASS INDEX: 32.48 KG/M2 | TEMPERATURE: 97.9 F | HEIGHT: 71 IN

## 2020-10-13 DIAGNOSIS — Z09 FOLLOW-UP EXAMINATION FOLLOWING SURGERY: ICD-10-CM

## 2020-10-13 DIAGNOSIS — G89.29 CHRONIC RIGHT-SIDED LOW BACK PAIN WITH RIGHT-SIDED SCIATICA: Primary | ICD-10-CM

## 2020-10-13 DIAGNOSIS — M54.41 CHRONIC RIGHT-SIDED LOW BACK PAIN WITH RIGHT-SIDED SCIATICA: Primary | ICD-10-CM

## 2020-10-13 PROCEDURE — 99024 POSTOP FOLLOW-UP VISIT: CPT | Performed by: NURSE PRACTITIONER

## 2020-10-13 RX ORDER — CITALOPRAM 20 MG/1
TABLET ORAL DAILY
COMMUNITY
Start: 2020-10-02 | End: 2021-10-04

## 2020-10-13 RX ORDER — TIZANIDINE 2 MG/1
TABLET ORAL
COMMUNITY
Start: 2020-10-09 | End: 2022-03-21

## 2020-10-13 RX ORDER — HYDROXYZINE HYDROCHLORIDE 25 MG/1
TABLET, FILM COATED ORAL
COMMUNITY
Start: 2020-10-02 | End: 2021-10-04

## 2020-10-29 ENCOUNTER — OFFICE VISIT (OUTPATIENT)
Dept: ORTHOPEDIC SURGERY | Facility: CLINIC | Age: 25
End: 2020-10-29

## 2020-10-29 VITALS — BODY MASS INDEX: 32.62 KG/M2 | TEMPERATURE: 96.5 F | HEIGHT: 71 IN | WEIGHT: 233 LBS

## 2020-10-29 DIAGNOSIS — M70.62 GREATER TROCHANTERIC BURSITIS OF BOTH HIPS: ICD-10-CM

## 2020-10-29 DIAGNOSIS — M70.61 GREATER TROCHANTERIC BURSITIS OF BOTH HIPS: ICD-10-CM

## 2020-10-29 DIAGNOSIS — Z98.890 HISTORY OF BACK SURGERY: ICD-10-CM

## 2020-10-29 DIAGNOSIS — M51.16 NEURITIS OR RADICULITIS DUE TO RUPTURE OF LUMBAR INTERVERTEBRAL DISC: ICD-10-CM

## 2020-10-29 DIAGNOSIS — M25.552 BILATERAL HIP PAIN: Primary | ICD-10-CM

## 2020-10-29 DIAGNOSIS — M51.24 THORACIC DISC HERNIATION: ICD-10-CM

## 2020-10-29 DIAGNOSIS — N31.9 NEUROGENIC BLADDER: ICD-10-CM

## 2020-10-29 DIAGNOSIS — M25.551 BILATERAL HIP PAIN: Primary | ICD-10-CM

## 2020-10-29 PROCEDURE — 73521 X-RAY EXAM HIPS BI 2 VIEWS: CPT | Performed by: NURSE PRACTITIONER

## 2020-10-29 PROCEDURE — 20610 DRAIN/INJ JOINT/BURSA W/O US: CPT | Performed by: NURSE PRACTITIONER

## 2020-10-29 PROCEDURE — 99204 OFFICE O/P NEW MOD 45 MIN: CPT | Performed by: NURSE PRACTITIONER

## 2020-10-29 RX ORDER — METHYLPREDNISOLONE ACETATE 80 MG/ML
80 INJECTION, SUSPENSION INTRA-ARTICULAR; INTRALESIONAL; INTRAMUSCULAR; SOFT TISSUE
Status: COMPLETED | OUTPATIENT
Start: 2020-10-29 | End: 2020-10-29

## 2020-10-29 RX ADMIN — METHYLPREDNISOLONE ACETATE 80 MG: 80 INJECTION, SUSPENSION INTRA-ARTICULAR; INTRALESIONAL; INTRAMUSCULAR; SOFT TISSUE at 15:23

## 2020-10-29 NOTE — PATIENT INSTRUCTIONS
Hip Bursitis    Hip bursitis is swelling of a fluid-filled sac (bursa) in your hip joint. This swelling (inflammation) can be painful. This condition may come and go over time.  What are the causes?  · Injury to the hip.  · Overuse of the muscles that surround the hip joint.  · An earlier injury or surgery of the hip.  · Arthritis or gout.  · Diabetes.  · Thyroid disease.  · Infection.  · In some cases, the cause may not be known.  What are the signs or symptoms?  · Mild or moderate pain in the hip area. Pain may get worse with movement.  · Tenderness and swelling of the hip, especially on the outer side of the hip.  · In rare cases, the bursa may become infected. This may cause:  ? A fever.  ? Warmth and redness in the area.  Symptoms may come and go.  How is this treated?  This condition is treated by resting, icing, applying pressure (compression), and raising (elevating) the injured area. You may hear this called the [place] treatment.  Treatment may also include:  · Using crutches.  · Draining fluid out of the bursa to help relieve swelling.  · Giving a shot of (injecting) medicine that helps to reduce swelling (cortisone).  · Other medicines if the bursa is infected.  Follow these instructions at home:  Managing pain, stiffness, and swelling    · If told, put ice on the painful area.  ? Put ice in a plastic bag.  ? Place a towel between your skin and the bag.  ? Leave the ice on for 20 minutes, 2-3 times a day.  ? Raise (elevate) your hip above the level of your heart as much as you can without pain. To do this, try putting a pillow under your hips while you lie down. Stop if this causes pain.  Activity  · Return to your normal activities as told by your doctor. Ask your doctor what activities are safe for you.  · Rest and protect your hip as much as you can until you feel better.  General instructions  · Take over-the-counter and prescription medicines only as told by your doctor.  · Wear wraps that put pressure  on your hip (compression wraps) only as told by your doctor.  · Do not use your hip to support your body weight until your doctor says that you can.  · Use crutches as told by your doctor.  · Gently rub and stretch your injured area as often as is comfortable.  · Keep all follow-up visits as told by your doctor. This is important.  How is this prevented?  · Exercise regularly, as told by your doctor.  · Warm up and stretch before being active.  · Cool down and stretch after being active.  · Avoid activities that bother your hip or cause pain.  · Avoid sitting down for long periods at a time.  Contact a doctor if:  · You have a fever.  · You get new symptoms.  · You have trouble walking.  · You have trouble doing everyday activities.  · You have pain that gets worse.  · You have pain that does not get better with medicine.  · You get red skin on your hip area.  · You get a feeling of warmth in your hip area.  Get help right away if:  · You cannot move your hip.  · You have very bad pain.  Summary  · Hip bursitis is swelling of a fluid-filled sac (bursa) in your hip.  · Hip bursitis can be painful.  · Symptoms often come and go over time.  · This condition is treated with rest, ice, compression, elevation, and medicines.  This information is not intended to replace advice given to you by your health care provider. Make sure you discuss any questions you have with your health care provider.  Document Released: 01/20/2012 Document Revised: 08/26/2019 Document Reviewed: 08/26/2019  [employer] Patient Education © 2020 [employer]

## 2020-10-29 NOTE — PROGRESS NOTES
"Patient Name: Poppy Carvalho   YOB: 1995  Referring Primary Care Physician: Argenis Bernstein APRN  BMI: Body mass index is 32.5 kg/m².    Chief Complaint:    Chief Complaint   Patient presents with   • Right Hip - Initial Evaluation, Pain   • Left Hip - Initial Evaluation, Pain        HPI: New pt to me presents with both hips hurting. She has a complex history of low back problems.  Ms. Carvalho had  Right L4-5 laminectomy, medial facetectomy, foraminotomies and discectomy using microsurgical technique microsurgical instrumentation done on 8/14/2020 by Dr Allen. She had initial back surgery at age 17 and does not know complete history but reports she had neurogenic bladder as a consequence and had multiple UTI as a child and was clumsy and may have been tethered cord? Saw Dr Tay trujillo neuro. Pt does not self cath but has difficulty with urination.Pt is here today for hip pain. 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  Pt complains of \" not being able to have flexibility in hips and burning pain in legs.\" Pt is in physical therapy and goes twice a week. Pt is in pain management and takes zanaflex and Norco twice a day. Pt does not take nsaids.         Poppy Carvalho is a 25 y.o. female who presents today for evaluation of   Chief Complaint   Patient presents with   • Right Hip - Initial Evaluation, Pain   • Left Hip - Initial Evaluation, Pain       This problem is new to this examiner.     Subjective   Medications:   Home Medications:  Current Outpatient Medications on File Prior to Visit   Medication Sig   • citalopram (CeleXA) 20 MG tablet Take  by mouth Daily.   • Etonogestrel (NEXPLANON) 68 MG implant subdermal implant Inject 1 each into the appropriate area of the skin as directed by provider 1 (One) Time.   • HYDROcodone-acetaminophen (NORCO) 5-325 MG per tablet Take 1 tablet by mouth Every 6 (Six) Hours As Needed for Moderate Pain  (pain).   • hydrOXYzine (ATARAX) 25 MG tablet TK 1 T PO Q 8 " H PRN   • tiZANidine (ZANAFLEX) 2 MG tablet TK 1 T PO Q NIGHT PRF MSP     No current facility-administered medications on file prior to visit.      Current Medications:  Scheduled Meds:  Continuous Infusions:No current facility-administered medications for this visit.     PRN Meds:.    I have reviewed the patient's medical history in detail and updated the computerized patient record.  Review and summarization of old records includes:    Past Medical History:   Diagnosis Date   • Anesthesia complication     HARD TO SEDATE   • Anxiety    • Bipolar 1 disorder (CMS/HCC)    • DDD (degenerative disc disease), lumbar    • Depression    • GERD (gastroesophageal reflux disease)    • Hx of migraines    • Neurogenic bladder    • Urinary retention         Past Surgical History:   Procedure Laterality Date   • KNEE SURGERY     • LUMBAR DISCECTOMY Right 8/14/2020    Procedure: Right lumbar 4 lumbar 5 laminectomy and discectomy with metrx;  Surgeon: Guy Allen MD;  Location: [facility];  Service: Neurosurgery;  Laterality: Right;   • LUMBAR LAMINECTOMY FOR TETHERED CORD RELEASE          Social History     Occupational History   • Not on file   Tobacco Use   • Smoking status: Current Every Day Smoker     Packs/day: 1.00     Years: 10.00     Pack years: 10.00     Types: Cigarettes   • Smokeless tobacco: Never Used   • Tobacco comment: LAST CIGARETTE 8/14/20 0330   Substance and Sexual Activity   • Alcohol use: Yes     Comment: SOCIAL   • Drug use: Never   • Sexual activity: Not on file      Social History     Social History Narrative   • Not on file        Family History   Problem Relation Age of Onset   • Hypertension Mother    • Alcohol abuse Father    • Diabetes Sister    • Malig Hyperthermia Neg Hx        ROS: 14 point review of systems was performed and all other systems were reviewed and are negative except for documented findings in HPI and today's encounter.     Allergies:   Allergies   Allergen Reactions   •  "Abilify [Aripiprazole] Rash     Constitutional:  Denies fever, shaking or chills   Eyes:  Denies change in visual acuity   HENT:  Denies nasal congestion or sore throat   Respiratory:  Denies cough or shortness of breath   Cardiovascular:  Denies chest pain or severe LE edema   GI:  Denies abdominal pain, nausea, vomiting, bloody stools or diarrhea   Musculoskeletal:  Numbness, tingling, pain, or loss of motor function only as noted above in history of present illness. +arthralgia hips  : Denies painful urination or hematuria  Integument:  Denies rash, lesion or ulceration   Neurologic:  Denies headache or focal weakness  Endocrine:  Denies lymphadenopathy  Psych:  Denies confusion or change in mental status   Hem:  Denies active bleeding    OBJECTIVE:  Physical Exam:   Temp 96.5 °F (35.8 °C)   Ht 180.3 cm (71\")   Wt 106 kg (233 lb)   BMI 32.50 kg/m²     General Appearance:    Alert, cooperative, in no acute distress                  Eyes: conjunctiva clear  ENT: external ears and nose atraumatic  CV: no peripheral edema  Resp: normal respiratory effort  Skin: no rashes or wounds; normal turgor  Psych: mood and affect appropriate  Lymph: no nodes appreciated  Neuro: gross sensation intact  Vascular:  Palpable peripheral pulse in noted extremity  Musculoskeletal Extremities: both hips with no pain with internal rotation, stinchfield negative, point tender to both hip bursa, skin warm, dry and intact, calf soft and nttp. Diffuse low back pain. Point tender to both greater trochanter bursa.     Radiology:   Both hips 3 views done for pain, no comparison views, no acute fracture and joint space preserved     Assessment:     ICD-10-CM ICD-9-CM   1. Bilateral hip pain  M25.551 719.45    M25.552    2. Greater trochanteric bursitis of both hips  M70.61 726.5    M70.62    3. History of back surgery  Z98.890 V45.89   4. Neurogenic bladder  N31.9 596.54   5. Neuritis or radiculitis due to rupture of lumbar intervertebral " disc  M51.16 722.10   6. Thoracic disc herniation  M51.24 722.11        Large Joint Arthrocentesis: R greater trochanteric bursa  Date/Time: 10/29/2020 3:23 PM  Consent given by: patient  Site marked: site marked  Timeout: Immediately prior to procedure a time out was called to verify the correct patient, procedure, equipment, support staff and site/side marked as required   Supporting Documentation  Indications: pain   Procedure Details  Location: hip - R greater trochanteric bursa  Preparation: Patient was prepped and draped in the usual sterile fashion  Needle gauge: 21 G.  Approach: lateral  Medications administered: 2 mL lidocaine (cardiac); 80 mg methylPREDNISolone acetate 80 MG/ML  Patient tolerance: patient tolerated the procedure well with no immediate complications    Large Joint Arthrocentesis: L greater trochanteric bursa  Date/Time: 10/29/2020 3:23 PM  Consent given by: patient  Site marked: site marked  Timeout: Immediately prior to procedure a time out was called to verify the correct patient, procedure, equipment, support staff and site/side marked as required   Supporting Documentation  Indications: pain   Procedure Details  Location: hip - L greater trochanteric bursa  Preparation: Patient was prepped and draped in the usual sterile fashion  Needle gauge: 21 G.  Approach: lateral  Medications administered: 2 mL lidocaine (cardiac); 80 mg methylPREDNISolone acetate 80 MG/ML  Patient tolerance: patient tolerated the procedure well with no immediate complications             Plan: The diagnosis(es), natural history, pathophysiology and treatment for diagnosis(es) were discussed. Opportunity given and questions answered.  Biomechanics of pertinent body areas discussed.  When appropriate, the use of ambulatory aids discussed.  BMI:  The concept of BMI body mass index and its importance and implications discussed.    EXERCISES:  Advice on benefits of, and types of regular/moderate exercise pertaining to  orthopedic diagnosis(es).  Inflammation/pain control; with cold, heat, elevation and/or liniments discussed as appropriate  CONSULT: This Consult is done at the request of a requesting provider to whom I will send this report with this rendered opinion.  MEDICAL RECORDS reviewed from other provider(s) for past and current medical history pertinent to this complaint.  Discussed smoking sensation and weight loss continue physical therapy and may consider medrol dose pack.     10/29/2020    Much of this encounter note is an electronic transcription/translation of spoken language to printed text. The electronic translation of spoken language may permit erroneous, or at times, nonsensical words or phrases to be inadvertently transcribed; Although I have reviewed the note for such errors, some may still exist

## 2020-11-03 ENCOUNTER — HOSPITAL ENCOUNTER (OUTPATIENT)
Dept: MRI IMAGING | Facility: HOSPITAL | Age: 25
Discharge: HOME | End: 2020-11-03

## 2020-11-03 DIAGNOSIS — M54.41 CHRONIC RIGHT-SIDED LOW BACK PAIN WITH RIGHT-SIDED SCIATICA: ICD-10-CM

## 2020-11-03 DIAGNOSIS — G89.29 CHRONIC RIGHT-SIDED LOW BACK PAIN WITH RIGHT-SIDED SCIATICA: ICD-10-CM

## 2020-11-03 PROCEDURE — A9577 INJ MULTIHANCE: HCPCS | Performed by: NURSE PRACTITIONER

## 2020-11-03 PROCEDURE — 72158 MRI LUMBAR SPINE W/O & W/DYE: CPT

## 2020-11-03 PROCEDURE — 0 GADOBENATE DIMEGLUMINE 529 MG/ML SOLUTION: Performed by: NURSE PRACTITIONER

## 2020-11-03 PROCEDURE — 72146 MRI CHEST SPINE W/O DYE: CPT

## 2020-11-03 RX ADMIN — GADOBENATE DIMEGLUMINE 20 ML: 529 INJECTION, SOLUTION INTRAVENOUS at 17:18

## 2020-11-04 NOTE — PROGRESS NOTES
Subjective   History of Present Illness: Poppy Carvalho is a 25 y.o. female is here today for follow-up with a new Thoracic and Lumbar MRI that was ordered at her last visit with Juany for back pain right hip and leg pain. Ms. Carvalho had Right L4-5 laminectomy, medial facetectomy, foraminotomies and discectomy done on 8/14/2020.    Today her symptoms are unchanged form her previous visit with Juany.    Patient is wearing a mask in our office today.      History of Present Illness     This patient continues with pain in her lower back primarily.  She has some pain in her hip and her leg as well but it sounds like most of her pain currently is in her back.  She had an episode this past weekend that was so severe she almost went to the emergency room but was able to breathe through it and ultimately the pain calm down some.    The following portions of the patient's history were reviewed and updated as appropriate: allergies, current medications, past family history, past medical history, past social history, past surgical history and problem list.    Review of Systems   Respiratory: Negative for chest tightness and shortness of breath.    Cardiovascular: Negative for chest pain.   Musculoskeletal: Positive for arthralgias and back pain.        Right leg pain       I have reviewed the review of systems as documented by my MA.      Objective     Vitals:    11/10/20 1542   BP: 125/82   Pulse: 78   Temp: 98.7 °F (37.1 °C)     There is no height or weight on file to calculate BMI.      Physical Exam  Neurological:      Mental Status: She is alert and oriented to person, place, and time.       Neurologic Exam     Mental Status   Oriented to person, place, and time.           Assessment/Plan   Independent Review of Radiographic Studies:      I personally reviewed the images from the following studies.    I reviewed her MRI of the thoracic and the lumbar spine myself.  The thoracic spine shows several levels of disc bulging  which are unchanged from her MRI of the thoracic spine done earlier this year.  They have gotten neither better or worse.  The MRI of the lumbar spine shows the level of surgery which the radiologist is now calling L3-4 but called L4-5 in June.  Either way it is the level where the disc was herniated centrally into the right side.  This appears to have improved markedly from the preoperative MRI which I also looked at that was done in June.  There is no evidence of recurrent disc and no evidence of a disc herniation at other levels.    Medical Decision Making:      I told the patient that from my point of view I do not see anything here that we would need to talk about further surgery for.  I did recommend she talk to the pain management doctors about either some epidural blocks or some trigger point injections.  It sounds like a lot of the pain she is having is secondary to muscle spasms.  She is already on muscle relaxers as well.  We will check her again in about 6 weeks.    Diagnoses and all orders for this visit:    1. Follow-up examination following surgery (Primary)      Return in about 6 weeks (around 12/22/2020).

## 2020-11-10 ENCOUNTER — OFFICE VISIT (OUTPATIENT)
Dept: NEUROSURGERY | Facility: CLINIC | Age: 25
End: 2020-11-10

## 2020-11-10 VITALS — TEMPERATURE: 98.7 F | HEART RATE: 78 BPM | DIASTOLIC BLOOD PRESSURE: 82 MMHG | SYSTOLIC BLOOD PRESSURE: 125 MMHG

## 2020-11-10 DIAGNOSIS — Z09 FOLLOW-UP EXAMINATION FOLLOWING SURGERY: Primary | ICD-10-CM

## 2020-11-10 PROCEDURE — 99024 POSTOP FOLLOW-UP VISIT: CPT | Performed by: NEUROLOGICAL SURGERY

## 2020-12-29 ENCOUNTER — TELEPHONE (OUTPATIENT)
Dept: NEUROSURGERY | Facility: CLINIC | Age: 25
End: 2020-12-29

## 2020-12-29 DIAGNOSIS — G89.29 CHRONIC RIGHT-SIDED LOW BACK PAIN WITH RIGHT-SIDED SCIATICA: ICD-10-CM

## 2020-12-29 DIAGNOSIS — R15.9 BOWEL AND BLADDER INCONTINENCE: Primary | ICD-10-CM

## 2020-12-29 DIAGNOSIS — R32 BOWEL AND BLADDER INCONTINENCE: Primary | ICD-10-CM

## 2020-12-29 DIAGNOSIS — M54.41 CHRONIC RIGHT-SIDED LOW BACK PAIN WITH RIGHT-SIDED SCIATICA: ICD-10-CM

## 2020-12-29 NOTE — TELEPHONE ENCOUNTER
I called patient. She stated that on 12-24-20 she had no urge to pee and then she bent over and peed on herself. She went to the ER and was discharged. She complains of constant back pain that radiates into bilateral hips and posterior legs. She denies numbness, tingling and weakness. She states that the bladder incontinence is intermittent.    She is scheduled for a telephone visit on 12-31-20. She had a Right L4-5 laminectomy, medial facetectomy, foraminotomies and discectomy using microsurgical technique microsurgical instrumentation done on 8/14/2020

## 2020-12-29 NOTE — TELEPHONE ENCOUNTER
Provider: DR IBRAHIM    Caller: JAZZY VINES  Relationship to Patient: SELF  Phone Number: [phone]    Reason for Call:  TRIED TO TRANSFER CALL 2 X AND NO ONE ANSWERED     PT WENT TO THE ER AT [facility]. SHE HAS LOST CONTROL OF HER BOWEL AND BLADDER   SHE IS HAVING A RIPPING PAIN ON HER LOWER BACK WHEN SHE COUGHS, LAUGHS, TALKS, ETC.     PT WAS TOLD AT THE ER THAT SHE NEEDS TO BE SEEN AS SOON AS POSSIBLE BY DR IBRAHIM    . PT COULD NOT BE ADMITTED TO THE HOSPITAL AT THAT TIME BECAUSE OF HER CHILDREN.    When was the patient last seen: 11/10/20  When did it start: 12/24/2020

## 2020-12-29 NOTE — TELEPHONE ENCOUNTER
See if we can get an MRI of her lumbar spine with and without contrast done before her visit on the 31st.

## 2020-12-29 NOTE — PROGRESS NOTES
Subjective   Patient ID: Poppy Carvalho is a 25 y.o. female is here today for follow-up via telephone visit. She was last seen in office on 11/10/2020 with Dr. Guy Allen MD for post op. She had a Right lumbar 4 lumbar 5 laminectomy and discectomy with metrx with Dr. Guy Allen MD on 08/14/2020.     You have chosen to receive care through a telephone visit. Do you consent to use a telephone visit for your medical care today? Yes    We had a telephone visit today.  The patient was at home and I was in the office.  We talked for 5 minutes.    History of Present Illness     The patient said that last week she had the acute onset of severe pain in her left shoulder blade region.  The pain was so severe she ultimately went to the emergency room.  She was told she had a herniated disc although I do not have access to any films.  She was going to be admitted to the hospital but did not have childcare and so did not go in the hospital.  Subsequent to that she also had one episode where she was incontinent of urine.  Since then she really has not had too much further trouble.  Because of the symptoms we sent her for a stat MRI of her lumbar spine.    The following portions of the patient's history were reviewed and updated as appropriate: allergies, current medications, past family history, past medical history, past social history, past surgical history and problem list.    Review of Systems    I have reviewed the review of systems as documented by my MA.      Objective     There were no vitals filed for this visit.  There is no height or weight on file to calculate BMI.      Physical Exam  Neurological:      Mental Status: She is oriented to person, place, and time.       Neurologic Exam     Mental Status   Oriented to person, place, and time.           Assessment/Plan   Independent Review of Radiographic Studies:      I personally reviewed the images from the following studies.    I reviewed her MRI of the lumbar  spine which was done this morning.  This shows a little disc bulging at L5-S1 but there is no evidence of any significant neural compression.    I also reviewed an MRI of her thoracic spine which was done in early November of this year.  This does show some disc bulging in the upper thoracic spine.    Medical Decision Making:      I told the patient that to be on the safe side I think we should check an MRI of her thoracic spine just to be sure that one of the disc herniations has not gotten any worse.  I think that is unlikely but she is very much in favor of getting it checked.  We will set her up for a telephone visit to go over the results when they have been completed.    Diagnoses and all orders for this visit:    1. Urinary incontinence, unspecified type (Primary)  -     MRI Thoracic Spine Without Contrast; Future      Return for After radiology test.

## 2020-12-30 ENCOUNTER — HOSPITAL ENCOUNTER (OUTPATIENT)
Dept: MRI IMAGING | Facility: HOSPITAL | Age: 25
Discharge: HOME | End: 2020-12-30

## 2020-12-31 ENCOUNTER — HOSPITAL ENCOUNTER (OUTPATIENT)
Dept: MRI IMAGING | Facility: HOSPITAL | Age: 25
Discharge: HOME | End: 2020-12-31
Admitting: NEUROLOGICAL SURGERY

## 2020-12-31 ENCOUNTER — OFFICE VISIT (OUTPATIENT)
Dept: NEUROSURGERY | Facility: CLINIC | Age: 25
End: 2020-12-31

## 2020-12-31 DIAGNOSIS — R15.9 BOWEL AND BLADDER INCONTINENCE: ICD-10-CM

## 2020-12-31 DIAGNOSIS — R32 URINARY INCONTINENCE, UNSPECIFIED TYPE: Primary | ICD-10-CM

## 2020-12-31 DIAGNOSIS — G89.29 CHRONIC RIGHT-SIDED LOW BACK PAIN WITH RIGHT-SIDED SCIATICA: ICD-10-CM

## 2020-12-31 DIAGNOSIS — M54.41 CHRONIC RIGHT-SIDED LOW BACK PAIN WITH RIGHT-SIDED SCIATICA: ICD-10-CM

## 2020-12-31 DIAGNOSIS — R32 BOWEL AND BLADDER INCONTINENCE: ICD-10-CM

## 2020-12-31 PROCEDURE — 72158 MRI LUMBAR SPINE W/O & W/DYE: CPT

## 2020-12-31 PROCEDURE — 0 GADOBENATE DIMEGLUMINE 529 MG/ML SOLUTION: Performed by: NEUROLOGICAL SURGERY

## 2020-12-31 PROCEDURE — A9577 INJ MULTIHANCE: HCPCS | Performed by: NEUROLOGICAL SURGERY

## 2020-12-31 PROCEDURE — 99441 PR PHYS/QHP TELEPHONE EVALUATION 5-10 MIN: CPT | Performed by: NEUROLOGICAL SURGERY

## 2020-12-31 RX ADMIN — GADOBENATE DIMEGLUMINE 20 ML: 529 INJECTION, SOLUTION INTRAVENOUS at 08:00

## 2021-01-17 ENCOUNTER — HOSPITAL ENCOUNTER (OUTPATIENT)
Dept: MRI IMAGING | Facility: HOSPITAL | Age: 26
Discharge: HOME | End: 2021-01-17
Admitting: NEUROLOGICAL SURGERY

## 2021-01-17 DIAGNOSIS — R32 URINARY INCONTINENCE, UNSPECIFIED TYPE: ICD-10-CM

## 2021-01-17 PROCEDURE — 72146 MRI CHEST SPINE W/O DYE: CPT

## 2021-01-19 NOTE — PROGRESS NOTES
"Subjective   Patient ID: Poppy Carvalho is a 25 y.o. female is here today for  follow-up with a new Thoracic MRI that was ordered at her last office visit 12/31/2020 for severe shoulder blade pain. Ms. Carvalho also had Right L4-5 laminectomy and discectomy with metrx done on 8/14/2020.    Today her symptoms are unchanged, L shoulder blade pain and low back pain.     Patient, provider and MA are all wearing a mask in our office today.    History of Present Illness     This patient continues with pain particularly in her left scapular region radiating all the way around and she says she feels like she has a ball stuck in her chest.  These of the same symptoms she had when she was here last.    The following portions of the patient's history were reviewed and updated as appropriate: allergies, current medications, past family history, past medical history, past social history, past surgical history and problem list.    Review of Systems   Constitutional: Negative for chills and fever.   HENT: Negative for congestion.    Genitourinary: Positive for difficulty urinating.   Musculoskeletal: Positive for back pain, neck pain and neck stiffness.   Neurological: Negative for weakness and numbness.       I have reviewed the review of systems as documented by my MA.      Objective     Vitals:    01/21/21 1050   BP: 126/79   Cuff Size: Large Adult   Pulse: 90   Temp: 98 °F (36.7 °C)   Weight: 106 kg (233 lb)   Height: 180.3 cm (71\")     Body mass index is 32.5 kg/m².      Physical Exam  Neurological:      Mental Status: She is alert and oriented to person, place, and time.       Neurologic Exam     Mental Status   Oriented to person, place, and time.           Assessment/Plan   Independent Review of Radiographic Studies:      I personally reviewed the images from the following studies.    I reviewed her MRI of the thoracic spine.  This was done on 17 January.  It shows no change from the MRI that was done in November of last year.  " There are some areas of disc bulging throughout her thoracic spine but no severe compression of the spinal cord.  At T10-T11 there is a little more bulging off to the left side with some distortion of the cord but again no cord pressure no change in the cord signal.  I also reviewed an MRI of her lumbar spine which was done in the end of December.  This shows no evidence of spinal stenosis at all and certainly nothing to substantiate a cauda equina syndrome.    Medical Decision Making:      I told the patient again that I do not believe her bladder problems are coming from her spine.  There is simply not enough pressure on the neural elements to cause problems with bladder.  The disc at T10-T11 is well below the area where she is having pain and so I really do not think it is contributing very much to her symptoms either.  I think she would be a good candidate for some intercostal injections.  She is already seeing a pain management doctor and will discuss this with them.    Diagnoses and all orders for this visit:    1. Thoracic disc herniation (Primary)      Return if symptoms worsen or fail to improve.

## 2021-01-21 ENCOUNTER — OFFICE VISIT (OUTPATIENT)
Dept: NEUROSURGERY | Facility: CLINIC | Age: 26
End: 2021-01-21

## 2021-01-21 ENCOUNTER — TELEPHONE (OUTPATIENT)
Dept: NEUROSURGERY | Facility: CLINIC | Age: 26
End: 2021-01-21

## 2021-01-21 VITALS
DIASTOLIC BLOOD PRESSURE: 79 MMHG | SYSTOLIC BLOOD PRESSURE: 126 MMHG | HEART RATE: 90 BPM | TEMPERATURE: 98 F | HEIGHT: 71 IN | BODY MASS INDEX: 32.62 KG/M2 | WEIGHT: 233 LBS

## 2021-01-21 DIAGNOSIS — M51.24 THORACIC DISC HERNIATION: Primary | ICD-10-CM

## 2021-01-21 PROCEDURE — 99213 OFFICE O/P EST LOW 20 MIN: CPT | Performed by: NEUROLOGICAL SURGERY

## 2021-01-21 NOTE — TELEPHONE ENCOUNTER
I l/m for pt to c/b I need the name of her urologist and her pain management dr so I can fax records over. If she calls please take this info. She does not need to talk to me specifically.

## 2021-01-29 ENCOUNTER — CLINICAL SUPPORT (OUTPATIENT)
Dept: ORTHOPEDIC SURGERY | Facility: CLINIC | Age: 26
End: 2021-01-29

## 2021-01-29 VITALS — HEIGHT: 71 IN | BODY MASS INDEX: 33.6 KG/M2 | TEMPERATURE: 96 F | WEIGHT: 240 LBS

## 2021-01-29 DIAGNOSIS — M25.551 LATERAL PAIN OF RIGHT HIP: ICD-10-CM

## 2021-01-29 DIAGNOSIS — M53.3 CHRONIC RIGHT SACROILIAC PAIN: Primary | ICD-10-CM

## 2021-01-29 DIAGNOSIS — G89.29 CHRONIC RIGHT SACROILIAC PAIN: Primary | ICD-10-CM

## 2021-01-29 PROCEDURE — 99214 OFFICE O/P EST MOD 30 MIN: CPT | Performed by: ORTHOPAEDIC SURGERY

## 2021-01-29 RX ORDER — OXYBUTYNIN CHLORIDE 5 MG/1
5 TABLET ORAL 2 TIMES DAILY
COMMUNITY
Start: 2021-01-22 | End: 2021-10-04

## 2021-01-29 RX ORDER — CEPHALEXIN 500 MG/1
CAPSULE ORAL
COMMUNITY
Start: 2021-01-27

## 2021-01-29 NOTE — PROGRESS NOTES
"Patient Name: Poppy Carvalho   YOB: 1995  Referring Primary Care Physician: Argenis Bernstein APRN  BMI: Body mass index is 33.47 kg/m².    Chief Complaint:    Chief Complaint   Patient presents with   • Left Hip - Follow-up   • Right Hip - Follow-up        HPI:     Poppy Carvalho is a 25 y.o. female who presents today for evaluation of   Chief Complaint   Patient presents with   • Left Hip - Follow-up   • Right Hip - Follow-up   .  Patient is seen today complaining of bilateral \"hip\" pain.  Actually she had back surgery in August 2020.  She had a matrix L3-4 and L4-5 laminectomy and discectomy.  Prior she said she had radicular pains going down her right leg.  She has had trouble with neurogenic bladder for some time and are trying to work that out between neurosurgery and urology.  Says she did therapy it did not help she saw Danielle in the past who injected both trochanters and said she has some bruising but did not really help her at all.  She said the pain runs down her leg all the way into her feet makes him feel heavy stiff and she says she gets knots around her right hip.  She says it feels like \"grinding\"      Subjective   Medications:   Home Medications:  Current Outpatient Medications on File Prior to Visit   Medication Sig   • cephalexin (KEFLEX) 500 MG capsule    • citalopram (CeleXA) 20 MG tablet Take  by mouth Daily.   • Etonogestrel (NEXPLANON) 68 MG implant subdermal implant Inject 1 each into the appropriate area of the skin as directed by provider 1 (One) Time.   • HYDROcodone-acetaminophen (NORCO) 5-325 MG per tablet Take 1 tablet by mouth Every 6 (Six) Hours As Needed for Moderate Pain  (pain).   • hydrOXYzine (ATARAX) 25 MG tablet TK 1 T PO Q 8 H PRN   • oxybutynin (DITROPAN) 5 MG tablet Take 5 mg by mouth 2 (Two) Times a Day.   • tiZANidine (ZANAFLEX) 2 MG tablet TK 1 T PO Q NIGHT PRF MSP     No current facility-administered medications on file prior to visit.      Current " Medications:  Scheduled Meds:  Continuous Infusions:No current facility-administered medications for this visit.     PRN Meds:.    I have reviewed the patient's medical history in detail and updated the computerized patient record.  Review and summarization of old records includes:    Past Medical History:   Diagnosis Date   • Anesthesia complication     HARD TO SEDATE   • Anxiety    • Bipolar 1 disorder (CMS/HCC)    • DDD (degenerative disc disease), lumbar    • Depression    • GERD (gastroesophageal reflux disease)    • Hx of migraines    • Neurogenic bladder    • Urinary retention         Past Surgical History:   Procedure Laterality Date   • KNEE SURGERY     • LUMBAR DISCECTOMY Right 8/14/2020    Procedure: Right lumbar 4 lumbar 5 laminectomy and discectomy with metrx;  Surgeon: Guy Allen MD;  Location: [facility];  Service: Neurosurgery;  Laterality: Right;   • LUMBAR LAMINECTOMY FOR TETHERED CORD RELEASE          Social History     Occupational History   • Not on file   Tobacco Use   • Smoking status: Current Every Day Smoker     Packs/day: 1.00     Years: 10.00     Pack years: 10.00     Types: Cigarettes   • Smokeless tobacco: Never Used   • Tobacco comment: LAST CIGARETTE 8/14/20 0330   Substance and Sexual Activity   • Alcohol use: Yes     Comment: SOCIAL   • Drug use: Never   • Sexual activity: Not on file      Social History     Social History Narrative   • Not on file        Family History   Problem Relation Age of Onset   • Hypertension Mother    • Alcohol abuse Father    • Diabetes Sister    • Malig Hyperthermia Neg Hx        ROS: 14 point review of systems was performed and all other systems were reviewed and are negative except for documented findings in HPI and today's encounter.     Allergies:   Allergies   Allergen Reactions   • Abilify [Aripiprazole] Rash     Constitutional:  Denies fever, shaking or chills   Eyes:  Denies change in visual acuity   HENT:  Denies nasal congestion or  "sore throat   Respiratory:  Denies cough or shortness of breath   Cardiovascular:  Denies chest pain or severe LE edema   GI:  Denies abdominal pain, nausea, vomiting, bloody stools or diarrhea   Musculoskeletal:  Numbness, tingling, pain, or loss of motor function only as noted above in history of present illness.  : Denies painful urination or hematuria  Integument:  Denies rash, lesion or ulceration   Neurologic:  Denies headache or focal weakness  Endocrine:  Denies lymphadenopathy  Psych:  Denies confusion or change in mental status   Hem:  Denies active bleeding    OBJECTIVE:  Physical Exam: 25 y.o. female  Wt Readings from Last 3 Encounters:   01/29/21 109 kg (240 lb)   01/21/21 106 kg (233 lb)   10/29/20 106 kg (233 lb)     Ht Readings from Last 1 Encounters:   01/29/21 180.3 cm (71\")     Body mass index is 33.47 kg/m².  Vitals:    01/29/21 1010   Temp: 96 °F (35.6 °C)     Vital signs reviewed.     General Appearance:    Alert, cooperative, in no acute distress                  Eyes: conjunctiva clear  ENT: external ears and nose atraumatic  CV: no peripheral edema  Resp: normal respiratory effort  Skin: no rashes or wounds; normal turgor  Psych: mood and affect appropriate  Lymph: no nodes appreciated  Neuro: gross sensation intact  Vascular:  Palpable peripheral pulse in noted extremity  Musculoskeletal Extremities: Exam today pleasant lady but with flat affect BMI is 33.47 she has lot of soft tissue around her hips.  Moderately tender over the right greater trochanter and right sacroiliac joint Nabeel test causes mild pain on the right.  She has full range of motion of the hips without any provocative pain to flexion add duction and internal rotation or NABEEL testing causes mild symptoms on the right.  She does well with [place] testing    Radiology:   AP of the hips lateral bilateral hips taken in the office in the past reviewed reviewed at this time for the first time show good joint spaces with " "some degenerative changes in the visualized portion lower lumbosacral spine.  She had a couple of MRIs in epic that are reviewed that show evidence of the previous surgery at those levels but there is also a T10-11 mild to moderate disc protrusion these were ordered by Dr. Gant.    Assessment:     ICD-10-CM ICD-9-CM   1. Chronic right sacroiliac pain  M53.3 724.6    G89.29 338.29   2. Lateral pain of right hip  M25.551 719.45        Procedures       Plan: The diagnosis(es), natural history, pathophysiology and treatment for diagnosis(es) were discussed. Opportunity given and questions answered.  Biomechanics of pertinent body areas discussed.  When appropriate, the use of ambulatory aids discussed.  SPECIALTY REFERRAL  CONSULT: This Consult is done at the request of a requesting provider to whom I will send this report with this rendered opinion.  MEDICAL RECORDS reviewed from other provider(s) for past and current medical history pertinent to this complaint.  She does not appear to have a \"hip\" problem.  I believe she is having referred pain from her back is causing lateral hip symptoms and SI joint symptoms I referred her to primary care sports medicine for evaluation and management.  She has been in pain management before and this may be an option if she does not get better.  Leave that her symptoms are radiating from her back problem.    1/29/2021    Much of this encounter note is an electronic transcription/translation of spoken language to printed text. The electronic translation of spoken language may permit erroneous, or at times, nonsensical words or phrases to be inadvertently transcribed; Although I have reviewed the note for such errors, some may still exist      "

## 2021-02-08 ENCOUNTER — OFFICE VISIT (OUTPATIENT)
Dept: SPORTS MEDICINE | Facility: CLINIC | Age: 26
End: 2021-02-08

## 2021-02-08 VITALS
SYSTOLIC BLOOD PRESSURE: 126 MMHG | RESPIRATION RATE: 16 BRPM | BODY MASS INDEX: 33.26 KG/M2 | OXYGEN SATURATION: 97 % | HEART RATE: 87 BPM | HEIGHT: 71 IN | WEIGHT: 237.6 LBS | TEMPERATURE: 98.5 F | DIASTOLIC BLOOD PRESSURE: 82 MMHG

## 2021-02-08 DIAGNOSIS — M53.3 SACROILIAC JOINT DYSFUNCTION: Primary | ICD-10-CM

## 2021-02-08 DIAGNOSIS — Z98.890 HISTORY OF BACK SURGERY: ICD-10-CM

## 2021-02-08 DIAGNOSIS — M51.24 THORACIC DISC HERNIATION: ICD-10-CM

## 2021-02-08 PROCEDURE — 99244 OFF/OP CNSLTJ NEW/EST MOD 40: CPT | Performed by: FAMILY MEDICINE

## 2021-02-08 RX ORDER — MELOXICAM 15 MG/1
15 TABLET ORAL DAILY
Qty: 30 TABLET | Refills: 2 | Status: SHIPPED | OUTPATIENT
Start: 2021-02-08 | End: 2021-10-04

## 2021-02-08 NOTE — PROGRESS NOTES
"Poppy is a 26 y.o. year old female here today for consultation requested by Dr. Zhang    Chief Complaint   Patient presents with   • Back Pain     right SI joint pain, right lateral hip - swelling - May 2020       History of Present Illness  Here today for low back and hip pain that has been present for several years.  She has a past history of L3-4 and L4-5 laminectomy/discectomy in August 2020.  Complains of grinding pain at the low back and hip, worsening in particular over the past 6 months.  She had an injection to the greater trochanteric bursa without benefit.  Pain is worse with using her foot to drive the car.  Associate with tingling down to the right foot.  Moderately severe, constant.    I have reviewed the patient's medical, family, and social history in detail and updated the computerized patient record.    Review of Systems   Constitutional: Negative for fever.   Musculoskeletal:        Per HPI   Skin: Negative for wound.   Neurological: Negative for weakness.   All other systems reviewed and are negative.      /82 (BP Location: Right arm, Patient Position: Sitting, Cuff Size: Adult)   Pulse 87   Temp 98.5 °F (36.9 °C) (Oral)   Resp 16   Ht 180.3 cm (71\")   Wt 108 kg (237 lb 9.6 oz)   SpO2 97%   Breastfeeding No   BMI 33.14 kg/m²    There were no vitals filed for this visit.     Physical Exam    Vital signs reviewed.   General: No acute distress.  Eyes: conjunctiva clear; pupils equally round and reactive  ENT: external ears and nose atraumatic; oropharynx clear  CV: no peripheral edema, 2+ distal pulses  Resp: normal respiratory effort, no use of accessory muscles  Skin: no rashes or wounds; normal turgor  Psych: mood and affect appropriate; recent and remote memory intact  Neuro: sensation to light touch intact    MSK Exam:  Ortho Exam  Low back and pelvis: Right-sided sacroiliac tenderness.  Also tenderness in the greater trochanter in the sciatic notch.  Normal range of motion.  There " is pain with pelvic tilt.  Negative straight leg raise and slump test.  She has normal hip range of motion and strength.    Reviewed lumbar MRI images and report from 12/31/2020.  This shows postsurgical changes, transitional anatomy, L4-L5 spondylolisthesis with foraminal stenosis.  Reviewed pelvis x-ray 10/29/2020 essentially normal.      Diagnoses and all orders for this visit:    Sacroiliac joint dysfunction  -     Ambulatory Referral to Physical Therapy Evaluate and treat  -     meloxicam (Mobic) 15 MG tablet; Take 1 tablet by mouth Daily.    History of back surgery  -     Ambulatory Referral to Physical Therapy Evaluate and treat  -     meloxicam (Mobic) 15 MG tablet; Take 1 tablet by mouth Daily.    Thoracic disc herniation  -     Ambulatory Referral to Physical Therapy Evaluate and treat  -     meloxicam (Mobic) 15 MG tablet; Take 1 tablet by mouth Daily.    Overall I believe this is largely sacroiliac in nature but she clearly has other lumbar pathology contributing as well.  I think she should be reevaluated in physical therapy, as she only had a few sessions of therapy after back surgery, certainly there was not a focus on her sacroiliac motion at that time.  Hopefully now that her back is improved, we can address her pelvic motion better.  Follow-up in about 1 month, consider sacroiliac injection.    si dysf - try PT (prior was imme postop)  ini inject    EMR Dragon/Transcription disclaimer:    Much of this encounter note is an electronic transcription/translation of spoken language to printed text.  The electronic translation of spoken language may permit erroneous, or at times, nonsensical words or phrases to be inadvertently transcribed.  Although I have reviewed the note for such errors some may still exist.

## 2021-03-16 ENCOUNTER — OFFICE VISIT (OUTPATIENT)
Dept: SPORTS MEDICINE | Facility: CLINIC | Age: 26
End: 2021-03-16

## 2021-03-16 VITALS
OXYGEN SATURATION: 98 % | SYSTOLIC BLOOD PRESSURE: 110 MMHG | BODY MASS INDEX: 33.18 KG/M2 | DIASTOLIC BLOOD PRESSURE: 70 MMHG | HEIGHT: 71 IN | HEART RATE: 81 BPM | WEIGHT: 237 LBS

## 2021-03-16 DIAGNOSIS — M53.3 PAIN OF RIGHT SACROILIAC JOINT: Primary | ICD-10-CM

## 2021-03-16 DIAGNOSIS — M53.3 SACROILIAC JOINT DYSFUNCTION: ICD-10-CM

## 2021-03-16 PROCEDURE — 20611 DRAIN/INJ JOINT/BURSA W/US: CPT | Performed by: FAMILY MEDICINE

## 2021-03-16 PROCEDURE — 99214 OFFICE O/P EST MOD 30 MIN: CPT | Performed by: FAMILY MEDICINE

## 2021-03-16 RX ORDER — TRIAMCINOLONE ACETONIDE 40 MG/ML
40 INJECTION, SUSPENSION INTRA-ARTICULAR; INTRAMUSCULAR ONCE
Status: COMPLETED | OUTPATIENT
Start: 2021-03-16 | End: 2021-03-16

## 2021-03-16 RX ADMIN — TRIAMCINOLONE ACETONIDE 40 MG: 40 INJECTION, SUSPENSION INTRA-ARTICULAR; INTRAMUSCULAR at 10:58

## 2021-03-16 NOTE — PROGRESS NOTES
"Poppy is a 26 y.o. year old female     Chief Complaint   Patient presents with   • Hip Problem     states that they feel like they are grinding up against her back; bilateral, feels it more on the right side.        History of Present Illness  HPI   Low back/hip pain not improved. Only went to PT once since last visit, no clear help. Worse with steps.       Review of Systems    /70 (BP Location: Left arm, Patient Position: Sitting, Cuff Size: Adult)   Pulse 81   Ht 180.3 cm (70.98\")   Wt 108 kg (237 lb)   SpO2 98%   BMI 33.07 kg/m²    There were no vitals filed for this visit.     Physical Exam    Vital signs reviewed.   General: No acute distress.      MSK Exam:  Ortho Exam    Ultrasound-Guided Sacroiliac Joint Injection Procedure Note    Right sacroiliac joint injection was discussed with the patient in detail, including indication, risks, benefits, and alternatives. Verbal consent was given for the procedure. Injection was performed by physician.  Injection site was identified by ultrasound examination, then cleaned with Betadine and alcohol swabs. Prior to needle insertion, ethyl chloride spray was used for surface anesthesia. Sterile technique was used. Ultrasound guidance was indicated for injection accuracy.  A 22-gauge, 3.5\" spinal needle was guided to the joint under continuous direct ultrasound visualization. Injectate was seen flowing underneath the superficial sacroiliac ligaments and passed without difficulty. The needle was removed and a simple bandage was applied. The procedure was tolerated well without difficulty.    Injection mixture:  1% lidocaine without epinephrine: 2 mL  40 mg/mL triamcinolone acetonide: 1 mL     Diagnoses and all orders for this visit:    Pain of right sacroiliac joint  -     triamcinolone acetonide (KENALOG-40) injection 40 mg    Sacroiliac joint dysfunction    I did not believe this is sacroiliac pain secondary to her chronic lumbar pathology.  We agreed on " injection today which was tolerated well.  I again stressed the importance of physical therapy for long-term maintenance here.  We will check on progress in about a month.      EMR Dragon/Transcription disclaimer:    Much of this encounter note is an electronic transcription/translation of spoken language to printed text.  The electronic translation of spoken language may permit erroneous, or at times, nonsensical words or phrases to be inadvertently transcribed.  Although I have reviewed the note for such errors some may still exist.

## 2021-04-16 ENCOUNTER — BULK ORDERING (OUTPATIENT)
Dept: CASE MANAGEMENT | Facility: OTHER | Age: 26
End: 2021-04-16

## 2021-04-16 DIAGNOSIS — Z23 IMMUNIZATION DUE: ICD-10-CM

## 2021-05-15 VITALS
OXYGEN SATURATION: 96 % | RESPIRATION RATE: 16 BRPM | HEIGHT: 71 IN | WEIGHT: 207.31 LBS | BODY MASS INDEX: 29.02 KG/M2 | SYSTOLIC BLOOD PRESSURE: 104 MMHG | HEART RATE: 71 BPM | DIASTOLIC BLOOD PRESSURE: 69 MMHG | TEMPERATURE: 97.5 F

## 2021-07-19 ENCOUNTER — HOSPITAL ENCOUNTER (EMERGENCY)
Facility: HOSPITAL | Age: 26
Discharge: HOME | End: 2021-07-19
Attending: EMERGENCY MEDICINE | Admitting: EMERGENCY MEDICINE

## 2021-07-19 ENCOUNTER — APPOINTMENT (OUTPATIENT)
Dept: GENERAL RADIOLOGY | Facility: HOSPITAL | Age: 26
End: 2021-07-19

## 2021-07-19 VITALS
OXYGEN SATURATION: 98 % | RESPIRATION RATE: 16 BRPM | TEMPERATURE: 98.2 F | HEART RATE: 92 BPM | DIASTOLIC BLOOD PRESSURE: 82 MMHG | SYSTOLIC BLOOD PRESSURE: 133 MMHG

## 2021-07-19 DIAGNOSIS — S93.402A SPRAIN OF LEFT ANKLE, UNSPECIFIED LIGAMENT, INITIAL ENCOUNTER: Primary | ICD-10-CM

## 2021-07-19 PROCEDURE — 99283 EMERGENCY DEPT VISIT LOW MDM: CPT

## 2021-07-19 PROCEDURE — 73610 X-RAY EXAM OF ANKLE: CPT

## 2021-07-19 RX ORDER — IBUPROFEN 600 MG/1
600 TABLET ORAL EVERY 8 HOURS PRN
Qty: 24 TABLET | Refills: 0 | Status: SHIPPED | OUTPATIENT
Start: 2021-07-19 | End: 2021-10-04

## 2021-07-19 NOTE — ED TRIAGE NOTES
Pt fell Friday and tripped in a hole with injury to left ankle.    Patient was wearing a face mask when I entered the room and they continued to wear a mask throughout our encounter. I wore PPE including gloves, eye protection, and a surgical mask whenever I was in the room with patient. Hand hygiene performed.

## 2021-07-20 NOTE — ED NOTES
This RN in appropriate ppe while in pt room. Pt wearing mask.        Jennifer Reynolds, RN  07/19/21 [id number]

## 2021-07-20 NOTE — ED PROVIDER NOTES
EMERGENCY DEPARTMENT ENCOUNTER    Room Number:  34/34  Date of encounter:  7/19/2021  PCP: Prudence Adkins APRN  Patient Care Team:  Prudence Adkins APRN as PCP - General (Nurse Practitioner)   Historian: Patient    HPI:  Chief Complaint: Left ankle injury  A complete HPI/ROS/PMH/PSH/SH/FH are unobtainable due to: Nothing    Context: Poppy Carvalho is a 26 y.o. female who presents to the ED c/o left ankle injury. She reports that on Friday she stepped into a hole and twisted her left ankle. She reports that she went to [facility] but they were busy and so she went to [facility] and [place]. She reports that an x-ray was performed and she was placed in a stirrup ankle brace. She reports persistent pain. She has not taken any medicine for her symptoms. She reports increased pain with ambulation. She reports it is severe. She denies other injury. She reports significant swelling. She reports some tingling in her left foot.    Prior record review: Orthopedic visits with Dr. Zhang 1/29/2021 for bilateral hip pain.    PAST MEDICAL HISTORY  Active Ambulatory Problems     Diagnosis Date Noted   • Thoracic disc herniation 06/25/2020   • Chronic back pain 06/25/2020   • Neurogenic bladder 06/25/2020   • Anxiety 06/25/2020   • History of back surgery 06/25/2020   • Bladder incontinence 06/25/2020   • Neuritis or radiculitis due to rupture of lumbar intervertebral disc 08/04/2020     Resolved Ambulatory Problems     Diagnosis Date Noted   • No Resolved Ambulatory Problems     Past Medical History:   Diagnosis Date   • Anesthesia complication    • Bipolar 1 disorder (CMS/[facility])    • DDD (degenerative disc disease), lumbar    • Depression    • GERD (gastroesophageal reflux disease)    • Hx of migraines    • Urinary retention          PAST SURGICAL HISTORY  Past Surgical History:   Procedure Laterality Date   • KNEE SURGERY     • LUMBAR DISCECTOMY Right 8/14/2020    Procedure: Right lumbar 4 lumbar 5  laminectomy and discectomy with metrx;  Surgeon: Guy Allen MD;  Location: [facility] OR;  Service: Neurosurgery;  Laterality: Right;   • LUMBAR LAMINECTOMY FOR TETHERED CORD RELEASE           FAMILY HISTORY  Family History   Problem Relation Age of Onset   • Hypertension Mother    • Alcohol abuse Father    • Diabetes Sister    • Malig Hyperthermia Neg Hx          SOCIAL HISTORY  Social History     Socioeconomic History   • Marital status: Single     Spouse name: Not on file   • Number of children: Not on file   • Years of education: Not on file   • Highest education level: Not on file   Tobacco Use   • Smoking status: Current Every Day Smoker     Packs/day: 1.00     Years: 10.00     Pack years: 10.00     Types: Cigarettes   • Smokeless tobacco: Never Used   • Tobacco comment: LAST CIGARETTE 8/14/20 0330   Vaping Use   • Vaping Use: Never used   Substance and Sexual Activity   • Alcohol use: Yes     Comment: SOCIAL   • Drug use: Never         ALLERGIES  Abilify [aripiprazole]        REVIEW OF SYSTEMS  Review of Systems   No weakness, no numbness, positive tingling, no headache, no chest pain, no shortness of breath  All systems reviewed and negative except for those discussed in HPI.       PHYSICAL EXAM    I have reviewed the triage vital signs and nursing notes.    ED Triage Vitals [07/19/21 1710]   Temp Heart Rate Resp BP SpO2   98.2 °F (36.8 °C) 82 16 147/90 100 %      Temp src Heart Rate Source Patient Position BP Location FiO2 (%)   Tympanic Monitor Sitting -- --       Physical Exam  GENERAL: Awake, alert, no acute distress  SKIN: Warm, dry  HENT: Normocephalic, atraumatic  EYES: no scleral icterus  CV: regular rhythm, regular rate  RESPIRATORY: normal effort, lungs clear  ABDOMEN: soft, nontender, nondistended  MUSCULOSKELETAL: no deformity. Ecchymosis and swelling to the left lateral ankle with tenderness. No calf tenderness or swelling. Strong pulses. Normal sensation and motor. No  instability.  NEURO: alert, moves all extremities, follows commands          LAB RESULTS  No results found for this or any previous visit (from the past 24 hour(s)).    Ordered the above labs and independently reviewed the results.        RADIOLOGY  XR Ankle 3+ View Left    Result Date: 7/19/2021  XR ANKLE 3+ VW LEFT-  07/19/2021  HISTORY: Left ankle injury.  There is mild lateral soft tissue swelling of the left ankle. There is a tiny osseous density adjacent to the cuboid on the lateral view which does not resemble a chip fracture fragment could represent small accessory ossicle.  No definite fractures are dislocations are seen.      There appears be mild lateral soft tissue swelling of the ankle. 2. No definite acute fractures or dislocations are seen.  This report was finalized on 7/19/2021 8:19 PM by Dr. Celso Hager M.D.        I ordered the above noted radiological studies. Reviewed by me and discussed with radiologist.  See dictation for official radiology interpretation.      PROCEDURES    Procedures      MEDICATIONS GIVEN IN ER    Medications - No data to display      PROGRESS, DATA ANALYSIS, CONSULTS, AND MEDICAL DECISION MAKING    All labs have been independently reviewed by me.  All radiology studies have been reviewed by me and discussed with radiologist dictating the report.   EKG's independently viewed and interpreted by me.  Discussion below represents my analysis of pertinent findings related to patient's condition, differential diagnosis, treatment plan and final disposition.    Differential diagnosis includes but is not limited to ankle fracture, ankle sprain, DVT.    ED Course as of Jul 19 2056   Mon Jul 19, 2021 2055 She arrives with crutches. She was placed in an ankle brace by the prior hospital on the weekend. Plan to switch her to a walking boot given her significant swelling and pain. She will probably tolerate this better. Plan to refer her to orthopedics. Instructed to keep her  foot elevated and use ice to help the swelling. This will also help the tingling. She is agreeable.    [TR]      ED Course User Index  [TR] Taurus Bonilla MD           PPE: Both the patient and I wore a surgical mask throughout the entire patient encounter. I wore protective goggles.       AS OF 20:56 EDT VITALS:    BP - 147/90  HR - 82  TEMP - 98.2 °F (36.8 °C) (Tympanic)  O2 SATS - 100%        DIAGNOSIS  Final diagnoses:   Sprain of left ankle, unspecified ligament, initial encounter         DISPOSITION  ED Disposition     ED Disposition Condition Comment    Discharge Stable                 Taurus Bonilla MD  07/19/21 2056

## 2021-07-21 ENCOUNTER — OFFICE VISIT (OUTPATIENT)
Dept: ORTHOPEDIC SURGERY | Facility: CLINIC | Age: 26
End: 2021-07-21

## 2021-07-21 VITALS — HEIGHT: 71 IN | BODY MASS INDEX: 31.5 KG/M2 | TEMPERATURE: 97.1 F | WEIGHT: 225 LBS

## 2021-07-21 DIAGNOSIS — S93.492A SPRAIN OF ANTERIOR TALOFIBULAR LIGAMENT OF LEFT ANKLE, INITIAL ENCOUNTER: ICD-10-CM

## 2021-07-21 DIAGNOSIS — M79.672 LEFT FOOT PAIN: Primary | ICD-10-CM

## 2021-07-21 DIAGNOSIS — S92.215A CLOSED NONDISPLACED FRACTURE OF CUBOID OF LEFT FOOT, INITIAL ENCOUNTER: ICD-10-CM

## 2021-07-21 PROCEDURE — 73630 X-RAY EXAM OF FOOT: CPT | Performed by: NURSE PRACTITIONER

## 2021-07-21 PROCEDURE — 99213 OFFICE O/P EST LOW 20 MIN: CPT | Performed by: NURSE PRACTITIONER

## 2021-07-21 NOTE — PROGRESS NOTES
Patient Name: Poppy Carvalho   YOB: 1995  Referring Primary Care Physician: Prudence Adkins APRN  BMI: Body mass index is 31.38 kg/m².    Chief Complaint:    Chief Complaint   Patient presents with   • Left Ankle - Pain        HPI: New pt to me presents with left ankle pain.  She fell and twisted her ankle on Friday was seen in the emergency room sent here for follow-up and treatment no history of prior ankle fracture in the past.  She reports swelling and pain in her foot as well    Poppy Carvalho is a 26 y.o. female who presents today for evaluation of   Chief Complaint   Patient presents with   • Left Ankle - Pain         Subjective   Medications:   Home Medications:  Current Outpatient Medications on File Prior to Visit   Medication Sig   • cephalexin (KEFLEX) 500 MG capsule    • citalopram (CeleXA) 20 MG tablet Take  by mouth Daily.   • Etonogestrel (NEXPLANON) 68 MG implant subdermal implant Inject 1 each into the appropriate area of the skin as directed by provider 1 (One) Time.   • HYDROcodone-acetaminophen (NORCO) 5-325 MG per tablet Take 1 tablet by mouth Every 6 (Six) Hours As Needed for Moderate Pain  (pain).   • hydrOXYzine (ATARAX) 25 MG tablet TK 1 T PO Q 8 H PRN   • ibuprofen (ADVIL,MOTRIN) 600 MG tablet Take 1 tablet by mouth Every 8 (Eight) Hours As Needed for Mild Pain .   • meloxicam (Mobic) 15 MG tablet Take 1 tablet by mouth Daily.   • oxybutynin (DITROPAN) 5 MG tablet Take 5 mg by mouth 2 (Two) Times a Day.   • tiZANidine (ZANAFLEX) 2 MG tablet TK 1 T PO Q NIGHT PRF MSP     No current facility-administered medications on file prior to visit.     Current Medications:  Scheduled Meds:  Continuous Infusions:No current facility-administered medications for this visit.    PRN Meds:.    I have reviewed the patient's medical history in detail and updated the computerized patient record.  Review and summarization of old records includes:    Past Medical History:   Diagnosis Date   •  Anesthesia complication     HARD TO SEDATE   • Anxiety    • Bipolar 1 disorder (CMS/[facility])    • DDD (degenerative disc disease), lumbar    • Depression    • GERD (gastroesophageal reflux disease)    • Hx of migraines    • Neurogenic bladder    • Urinary retention         Past Surgical History:   Procedure Laterality Date   • KNEE SURGERY     • LUMBAR DISCECTOMY Right 8/14/2020    Procedure: Right lumbar 4 lumbar 5 laminectomy and discectomy with metrx;  Surgeon: Guy Allen MD;  Location: [facility];  Service: Neurosurgery;  Laterality: Right;   • LUMBAR LAMINECTOMY FOR TETHERED CORD RELEASE          Social History     Occupational History   • Not on file   Tobacco Use   • Smoking status: Current Every Day Smoker     Packs/day: 1.00     Years: 10.00     Pack years: 10.00     Types: Cigarettes   • Smokeless tobacco: Never Used   Vaping Use   • Vaping Use: Never used   Substance and Sexual Activity   • Alcohol use: Yes     Comment: SOCIAL   • Drug use: Never   • Sexual activity: Defer      Social History     Social History Narrative   • Not on file        Family History   Problem Relation Age of Onset   • Hypertension Mother    • Alcohol abuse Father    • Diabetes Sister    • Malig Hyperthermia Neg Hx        ROS: 14 point review of systems was performed and all other systems were reviewed and are negative except for documented findings in HPI and today's encounter.     Allergies:   Allergies   Allergen Reactions   • Abilify [Aripiprazole] Rash     Constitutional:  Denies fever, shaking or chills   Eyes:  Denies change in visual acuity   HENT:  Denies nasal congestion or sore throat   Respiratory:  Denies cough or shortness of breath   Cardiovascular:  Denies chest pain or severe LE edema   GI:  Denies abdominal pain, nausea, vomiting, bloody stools or diarrhea   Musculoskeletal:  Numbness, tingling, pain, or loss of motor function only as noted above in history of present illness.  : Denies painful urination  "or hematuria  Integument:  Denies rash, lesion or ulceration   Neurologic:  Denies headache or focal weakness  Endocrine:  Denies lymphadenopathy  Psych:  Denies confusion or change in mental status   Hem:  Denies active bleeding    OBJECTIVE:  Physical Exam: 26 y.o. female  Wt Readings from Last 3 Encounters:   07/21/21 102 kg (225 lb)   03/16/21 108 kg (237 lb)   02/08/21 108 kg (237 lb 9.6 oz)     Ht Readings from Last 1 Encounters:   07/21/21 180.3 cm (71\")     Body mass index is 31.38 kg/m².  Vitals:    07/21/21 1310   Temp: 97.1 °F (36.2 °C)     Vital signs reviewed.     General Appearance:    Alert, cooperative, in no acute distress                  Eyes: conjunctiva clear  ENT: external ears and nose atraumatic  CV: no peripheral edema  Resp: normal respiratory effort  Skin: no rashes or wounds; normal turgor  Psych: mood and affect appropriate  Lymph: no nodes appreciated  Neuro: gross sensation intact  Vascular:  Palpable peripheral pulse in noted extremity  Musculoskeletal Extremities: Skin is warm dry and intact she has ecchymosis to both sides of her foot and lateral malleolus base of fifth metatarsal is tender to palpation she has some edema Achilles appears to be intact calf is soft and nontender she is tender over the  anterior talofibular ligament compartments are soft there is no obvious deformity or angulation    Radiology: X-rays were done in the ER of the ankle I also got an x-ray of the left foot today that shows an avulsion fracture of the cuboid no comparison films done for pain and swelling  HISTORY: Left ankle injury.     There is mild lateral soft tissue swelling of the left ankle. There is a  tiny osseous density adjacent to the cuboid on the lateral view which  does not resemble a chip fracture fragment could represent small  accessory ossicle.     No definite fractures are dislocations are seen.     IMPRESSION:  There appears be mild lateral soft tissue swelling of the  ankle.  2. No " definite acute fractures or dislocations are seen.     This report was finalized on 7/19/2021 8:19 PM by Dr. Celso Hager M.D.             Assessment:     ICD-10-CM ICD-9-CM   1. Left foot pain  M79.672 729.5   2. Sprain of anterior talofibular ligament of left ankle, initial encounter  S93.492A 845.09   3. Closed nondisplaced fracture of cuboid of left foot, initial encounter  S92.215A 825.23        MDM/Plan:   The diagnosis(es), natural history, pathophysiology and treatment for diagnosis(es) were discussed. Opportunity given and questions answered.  Biomechanics of pertinent body areas discussed.  When appropriate, the use of ambulatory aids discussed.    The diagnosis(es), natural history, pathophysiology and treatment for diagnosis(es) were discussed. Opportunity given and questions answered.  Biomechanics of pertinent body areas discussed.  When appropriate, the use of ambulatory aids discussed.  BMI:  The concept of BMI body mass index and its importance and implications discussed.    EXERCISES:  Advice on benefits of, and types of regular/moderate exercise pertaining to orthopedic diagnosis(es).  MEDICATIONS:  The risks, benefits, warnings,side effects and alternatives of medications discussed.  Inflammation/pain control; with cold, heat, elevation and/or liniments discussed as appropriate  PT referral.  MEDICAL RECORDS reviewed from other provider(s) for past and current medical history pertinent to this complaint.  Discussed calling to schedule MRI if not significantly better with current treatment.   Continue fracture walker boot rest ice elevation she has crutches she is followed with sports medicine the past can follow-up with them if pain becomes persistent and does not improve can get an MRI    7/22/2021    Much of this encounter note is an electronic transcription/translation of spoken language to printed text. The electronic translation of spoken language may permit erroneous, or at times,  nonsensical words or phrases to be inadvertently transcribed; Although I have reviewed the note for such errors, some may still exist

## 2021-07-29 ENCOUNTER — OFFICE VISIT (OUTPATIENT)
Dept: SPORTS MEDICINE | Facility: CLINIC | Age: 26
End: 2021-07-29

## 2021-07-29 VITALS
DIASTOLIC BLOOD PRESSURE: 70 MMHG | TEMPERATURE: 98 F | HEART RATE: 81 BPM | SYSTOLIC BLOOD PRESSURE: 120 MMHG | WEIGHT: 225 LBS | BODY MASS INDEX: 31.5 KG/M2 | OXYGEN SATURATION: 98 % | HEIGHT: 71 IN | RESPIRATION RATE: 16 BRPM

## 2021-07-29 DIAGNOSIS — M25.572 ACUTE LEFT ANKLE PAIN: Primary | ICD-10-CM

## 2021-07-29 DIAGNOSIS — IMO0001 GRADE 1 ANKLE SPRAIN, LEFT, INITIAL ENCOUNTER: ICD-10-CM

## 2021-07-29 PROCEDURE — 99213 OFFICE O/P EST LOW 20 MIN: CPT | Performed by: FAMILY MEDICINE

## 2021-07-29 RX ORDER — METHYLPREDNISOLONE 4 MG/1
TABLET ORAL
Qty: 21 TABLET | Refills: 0 | Status: SHIPPED | OUTPATIENT
Start: 2021-07-29 | End: 2021-10-04
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
## 2021-08-16 ENCOUNTER — TELEPHONE (OUTPATIENT)
Dept: NEUROSURGERY | Facility: CLINIC | Age: 26
End: 2021-08-16

## 2021-08-16 DIAGNOSIS — M54.41 CHRONIC RIGHT-SIDED LOW BACK PAIN WITH RIGHT-SIDED SCIATICA: Primary | ICD-10-CM

## 2021-08-16 DIAGNOSIS — G89.29 CHRONIC RIGHT-SIDED LOW BACK PAIN WITH RIGHT-SIDED SCIATICA: Primary | ICD-10-CM

## 2021-08-16 DIAGNOSIS — M54.40 LOW BACK PAIN WITH SCIATICA, SCIATICA LATERALITY UNSPECIFIED, UNSPECIFIED BACK PAIN LATERALITY, UNSPECIFIED CHRONICITY: ICD-10-CM
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
## 2021-08-18 NOTE — TELEPHONE ENCOUNTER
S/W patient and informed her that per Dr. Allen we will get an xray of her lumbar spine once she is out of quarantine and then she will need to f/u with Dr. Allen In the office.

## 2021-08-18 NOTE — TELEPHONE ENCOUNTER
We can send her for an x-ray of her lumbar spine when she gets out of quarantine and then see her in the office after that.

## 2021-09-14 ENCOUNTER — HOSPITAL ENCOUNTER (OUTPATIENT)
Dept: GENERAL RADIOLOGY | Facility: HOSPITAL | Age: 26
Discharge: HOME | End: 2021-09-14
Admitting: NEUROLOGICAL SURGERY

## 2021-09-14 DIAGNOSIS — M54.40 LOW BACK PAIN WITH SCIATICA, SCIATICA LATERALITY UNSPECIFIED, UNSPECIFIED BACK PAIN LATERALITY, UNSPECIFIED CHRONICITY: ICD-10-CM

## 2021-09-14 PROCEDURE — 72100 X-RAY EXAM L-S SPINE 2/3 VWS: CPT

## 2021-09-15 ENCOUNTER — TELEPHONE (OUTPATIENT)
Dept: NEUROSURGERY | Facility: CLINIC | Age: 26
End: 2021-09-15

## 2021-09-15 NOTE — TELEPHONE ENCOUNTER
S/W patient and informed her of her appointment with Dr. Allen on 09/23/2021.    ----- Message from Guy Allen MD sent at 9/14/2021  3:41 PM EDT -----  I think this lady was supposed to come back to the office after her x-ray which has now been done.    ----- Message -----  From: Interface, Rad Results [place] In  Sent: 9/14/2021   3:30 PM EDT  To: Guy Allen MD

## 2021-09-23 ENCOUNTER — OFFICE VISIT (OUTPATIENT)
Dept: NEUROSURGERY | Facility: CLINIC | Age: 26
End: 2021-09-23

## 2021-09-23 VITALS
HEIGHT: 71 IN | HEART RATE: 89 BPM | WEIGHT: 225 LBS | SYSTOLIC BLOOD PRESSURE: 100 MMHG | DIASTOLIC BLOOD PRESSURE: 90 MMHG | BODY MASS INDEX: 31.5 KG/M2 | TEMPERATURE: 98 F

## 2021-09-23 DIAGNOSIS — M46.1 SACROILIAC INFLAMMATION (HCC): Primary | ICD-10-CM

## 2021-09-23 PROCEDURE — 99213 OFFICE O/P EST LOW 20 MIN: CPT | Performed by: NEUROLOGICAL SURGERY

## 2021-09-23 RX ORDER — DICLOFENAC SODIUM 75 MG/1
TABLET, DELAYED RELEASE ORAL
COMMUNITY
Start: 2021-07-06 | End: 2021-10-04

## 2021-09-23 RX ORDER — PREDNISONE 20 MG/1
TABLET ORAL
COMMUNITY
Start: 2021-07-06 | End: 2021-10-04

## 2021-09-23 RX ORDER — ORPHENADRINE CITRATE 100 MG/1
TABLET, EXTENDED RELEASE ORAL
COMMUNITY
Start: 2021-07-15 | End: 2021-10-04

## 2021-09-23 RX ORDER — BACLOFEN 20 MG/1
TABLET ORAL
COMMUNITY
Start: 2021-07-06 | End: 2022-03-21

## 2021-09-23 RX ORDER — NAPROXEN SODIUM 220 MG
220 TABLET ORAL
COMMUNITY
End: 2021-10-04
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
## 2021-10-04 ENCOUNTER — HOSPITAL ENCOUNTER (EMERGENCY)
Facility: HOSPITAL | Age: 26
Discharge: HOME | End: 2021-10-04
Attending: EMERGENCY MEDICINE | Admitting: EMERGENCY MEDICINE

## 2021-10-04 VITALS
RESPIRATION RATE: 16 BRPM | BODY MASS INDEX: 31.23 KG/M2 | HEIGHT: 71 IN | WEIGHT: 223.11 LBS | TEMPERATURE: 97.7 F | HEART RATE: 76 BPM | SYSTOLIC BLOOD PRESSURE: 108 MMHG | DIASTOLIC BLOOD PRESSURE: 68 MMHG | OXYGEN SATURATION: 100 %

## 2021-10-04 DIAGNOSIS — R51.9 NONINTRACTABLE HEADACHE, UNSPECIFIED CHRONICITY PATTERN, UNSPECIFIED HEADACHE TYPE: Primary | ICD-10-CM

## 2021-10-04 PROCEDURE — 25010000002 DROPERIDOL PER 5 MG: Performed by: EMERGENCY MEDICINE

## 2021-10-04 PROCEDURE — 96374 THER/PROPH/DIAG INJ IV PUSH: CPT

## 2021-10-04 PROCEDURE — 99282 EMERGENCY DEPT VISIT SF MDM: CPT

## 2021-10-04 PROCEDURE — 25010000002 DIPHENHYDRAMINE PER 50 MG: Performed by: EMERGENCY MEDICINE

## 2021-10-04 PROCEDURE — 25010000002 KETOROLAC TROMETHAMINE PER 15 MG: Performed by: EMERGENCY MEDICINE

## 2021-10-04 PROCEDURE — 96375 TX/PRO/DX INJ NEW DRUG ADDON: CPT

## 2021-10-04 PROCEDURE — 25010000002 PROCHLORPERAZINE 10 MG/2ML SOLUTION: Performed by: EMERGENCY MEDICINE

## 2021-10-04 RX ORDER — HYDROCODONE BITARTRATE AND ACETAMINOPHEN 7.5; 325 MG/1; MG/1
1 TABLET ORAL EVERY 6 HOURS PRN
COMMUNITY

## 2021-10-04 RX ORDER — DIPHENHYDRAMINE HYDROCHLORIDE 50 MG/ML
25 INJECTION INTRAMUSCULAR; INTRAVENOUS ONCE
Status: COMPLETED | OUTPATIENT
Start: 2021-10-04 | End: 2021-10-04

## 2021-10-04 RX ORDER — DROPERIDOL 2.5 MG/ML
2.5 INJECTION, SOLUTION INTRAMUSCULAR; INTRAVENOUS ONCE
Status: COMPLETED | OUTPATIENT
Start: 2021-10-04 | End: 2021-10-04

## 2021-10-04 RX ORDER — KETOROLAC TROMETHAMINE 30 MG/ML
30 INJECTION, SOLUTION INTRAMUSCULAR; INTRAVENOUS ONCE
Status: COMPLETED | OUTPATIENT
Start: 2021-10-04 | End: 2021-10-04

## 2021-10-04 RX ORDER — MELOXICAM 15 MG/1
15 TABLET ORAL DAILY
COMMUNITY

## 2021-10-04 RX ORDER — PROCHLORPERAZINE EDISYLATE 5 MG/ML
10 INJECTION INTRAMUSCULAR; INTRAVENOUS ONCE
Status: COMPLETED | OUTPATIENT
Start: 2021-10-04 | End: 2021-10-04

## 2021-10-04 RX ORDER — BUTALBITAL, ACETAMINOPHEN AND CAFFEINE 50; 325; 40 MG/1; MG/1; MG/1
1 TABLET ORAL EVERY 4 HOURS PRN
COMMUNITY

## 2021-10-04 RX ADMIN — PROCHLORPERAZINE EDISYLATE 10 MG: 5 INJECTION INTRAMUSCULAR; INTRAVENOUS at 01:55

## 2021-10-04 RX ADMIN — DIPHENHYDRAMINE HYDROCHLORIDE 25 MG: 50 INJECTION, SOLUTION INTRAMUSCULAR; INTRAVENOUS at 01:55

## 2021-10-04 RX ADMIN — KETOROLAC TROMETHAMINE 30 MG: 30 INJECTION, SOLUTION INTRAMUSCULAR; INTRAVENOUS at 01:55

## 2021-10-04 RX ADMIN — DROPERIDOL 2.5 MG: 2.5 INJECTION, SOLUTION INTRAMUSCULAR; INTRAVENOUS at 03:39

## 2021-10-04 RX ADMIN — SODIUM CHLORIDE 1000 ML: 9 INJECTION, SOLUTION INTRAVENOUS at 02:02
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
## 2022-03-21 ENCOUNTER — HOSPITAL ENCOUNTER (EMERGENCY)
Facility: HOSPITAL | Age: 27
Discharge: HOME | End: 2022-03-21
Attending: EMERGENCY MEDICINE | Admitting: EMERGENCY MEDICINE

## 2022-03-21 ENCOUNTER — APPOINTMENT (OUTPATIENT)
Dept: GENERAL RADIOLOGY | Facility: HOSPITAL | Age: 27
End: 2022-03-21

## 2022-03-21 VITALS
HEART RATE: 88 BPM | OXYGEN SATURATION: 100 % | HEIGHT: 71 IN | WEIGHT: 225.53 LBS | TEMPERATURE: 98.4 F | RESPIRATION RATE: 20 BRPM | DIASTOLIC BLOOD PRESSURE: 82 MMHG | SYSTOLIC BLOOD PRESSURE: 134 MMHG | BODY MASS INDEX: 31.57 KG/M2

## 2022-03-21 DIAGNOSIS — M25.522 LEFT ELBOW PAIN: ICD-10-CM

## 2022-03-21 DIAGNOSIS — M54.50 ACUTE BILATERAL LOW BACK PAIN WITHOUT SCIATICA: ICD-10-CM
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
## 2022-03-21 DIAGNOSIS — M25.552 LEFT HIP PAIN: ICD-10-CM

## 2022-03-21 DIAGNOSIS — M25.512 ACUTE PAIN OF LEFT SHOULDER: ICD-10-CM

## 2022-03-21 PROCEDURE — 99282 EMERGENCY DEPT VISIT SF MDM: CPT

## 2022-03-21 PROCEDURE — 73080 X-RAY EXAM OF ELBOW: CPT

## 2022-03-21 PROCEDURE — 96372 THER/PROPH/DIAG INJ SC/IM: CPT

## 2022-03-21 PROCEDURE — 72100 X-RAY EXAM L-S SPINE 2/3 VWS: CPT

## 2022-03-21 PROCEDURE — 25010000002 ORPHENADRINE CITRATE PER 60 MG: Performed by: EMERGENCY MEDICINE

## 2022-03-21 PROCEDURE — 73502 X-RAY EXAM HIP UNI 2-3 VIEWS: CPT

## 2022-03-21 RX ORDER — HYDROCODONE BITARTRATE AND ACETAMINOPHEN 5; 325 MG/1; MG/1
1 TABLET ORAL EVERY 6 HOURS PRN
Status: DISCONTINUED | OUTPATIENT
Start: 2022-03-21 | End: 2022-03-22 | Stop reason: HOSPADM

## 2022-03-21 RX ORDER — DICLOFENAC SODIUM 75 MG/1
75 TABLET, DELAYED RELEASE ORAL 2 TIMES DAILY
Qty: 20 TABLET | Refills: 0 | Status: SHIPPED | OUTPATIENT
Start: 2022-03-21

## 2022-03-21 RX ORDER — ORPHENADRINE CITRATE 30 MG/ML
60 INJECTION INTRAMUSCULAR; INTRAVENOUS ONCE
Status: COMPLETED | OUTPATIENT
Start: 2022-03-21 | End: 2022-03-21

## 2022-03-21 RX ORDER — TIZANIDINE HYDROCHLORIDE 4 MG/1
4 CAPSULE, GELATIN COATED ORAL 3 TIMES DAILY
Qty: 20 CAPSULE | Refills: 0 | Status: SHIPPED | OUTPATIENT
Start: 2022-03-21

## 2022-03-21 RX ADMIN — ORPHENADRINE CITRATE 60 MG: 60 INJECTION INTRAMUSCULAR; INTRAVENOUS at 20:52

## 2022-03-22 NOTE — DISCHARGE INSTRUCTIONS
Rest, ice to areas for comfort.  Take your meds as prescribed.  Gentle range of motion stretches several times a day to help with comfort.  Follow-up with VIDA Choi in 2 days for further evaluation and treatment and to ensure that your symptoms are improving.  Return to the emergency department for any bladder or bowel incontinence, any neurological symptoms, or any new or worse concerns.
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
## 2023-08-21 NOTE — PROGRESS NOTES
Subjective   Patient ID: Poppy Carvalho is a 28 y.o. female is here today for follow-up with  a new MRI T-spine done on 07/28/2023 @ [facility].    Today patient reports that her symptoms are worsening, patient states that she has tightness and tingling in B/L legs and low back pain     History of Present Illness    This patient returns today.  She continues with pain in her back with some numbness and tingling in both of her legs.  She feels that the pain is getting worse.    The following portions of the patient's history were reviewed and updated as appropriate: allergies, current medications, past family history, past medical history, past social history, past surgical history, and problem list.    Review of Systems   Constitutional:  Negative for chills and fever.   HENT:  Negative for congestion.    Musculoskeletal:  Positive for back pain and myalgias.   Neurological:  Positive for weakness and numbness.     I reviewed the review of systems listed by the patient and discussed by my MA    Objective     There were no vitals filed for this visit.  There is no height or weight on file to calculate BMI.    Tobacco Use: High Risk    Smoking Tobacco Use: Every Day    Smokeless Tobacco Use: Never    Passive Exposure: Not on file          Physical Exam  Neurological:      Mental Status: She is alert and oriented to person, place, and time.     Neurologic Exam     Mental Status   Oriented to person, place, and time.         Assessment & Plan   Independent Review of Radiographic Studies:      I personally reviewed the images from the following studies.    I reviewed her MRI of the thoracic spine.  This shows the disc at T11-T12 that we were looking at.  This does not show any further herniation and no change in the cord signal.    Medical Decision Making:      I told the patient I really do not think that her pain is coming from the disc.  Therefore I really would not recommend doing any surgery.  I did suggest we  get her into see a pain management doctor.    Diagnoses and all orders for this visit:    1. Thoracic disc herniation (Primary)  -     Ambulatory Referral to Pain Management      Return if symptoms worsen or fail to improve.

## 2023-08-22 ENCOUNTER — OFFICE VISIT (OUTPATIENT)
Dept: NEUROSURGERY | Facility: CLINIC | Age: 28
End: 2023-08-22
Payer: COMMERCIAL

## 2023-08-22 DIAGNOSIS — M51.24 THORACIC DISC HERNIATION: Primary | ICD-10-CM

## 2023-08-22 PROCEDURE — 1159F MED LIST DOCD IN RCRD: CPT | Performed by: NEUROLOGICAL SURGERY

## 2023-08-22 PROCEDURE — 1160F RVW MEDS BY RX/DR IN RCRD: CPT | Performed by: NEUROLOGICAL SURGERY

## 2023-08-22 PROCEDURE — 99214 OFFICE O/P EST MOD 30 MIN: CPT | Performed by: NEUROLOGICAL SURGERY

## 2023-08-30 ENCOUNTER — TELEPHONE (OUTPATIENT)
Dept: PAIN MEDICINE | Facility: CLINIC | Age: 28
End: 2023-08-30
Payer: COMMERCIAL

## (undated) DEVICE — PK NEURO SPINE 40

## (undated) DEVICE — GLV SURG SENSICARE W/ALOE PF LF 7.5 STRL

## (undated) DEVICE — ANTIBACTERIAL UNDYED BRAIDED (POLYGLACTIN 910), SYNTHETIC ABSORBABLE SUTURE: Brand: COATED VICRYL

## (undated) DEVICE — SYR CONTRL LUERLOK 10CC

## (undated) DEVICE — UNDYED BRAIDED (POLYGLACTIN 910), SYNTHETIC ABSORBABLE SUTURE: Brand: COATED VICRYL

## (undated) DEVICE — APPL CHLORAPREP HI/LITE 26ML ORNG

## (undated) DEVICE — Device

## (undated) DEVICE — CONN TBG Y 5 IN 1 LF STRL

## (undated) DEVICE — ADHS SKIN DERMABOND TOP ADVANCED

## (undated) DEVICE — CODMAN® SURGICAL PATTIES 3/4" X 3/4" (1.91CM X 1.91CM): Brand: CODMAN®

## (undated) DEVICE — SPNG GZ WOVN 4X4IN 12PLY 10/BX STRL

## (undated) DEVICE — GLV SURG BIOGEL LTX PF 7

## (undated) DEVICE — 6.0MM PRECISION ROUND

## (undated) DEVICE — SOL NACL 0.9PCT 100ML SGL

## (undated) DEVICE — NEEDLE, QUINCKE, 20GX3.5": Brand: MEDLINE

## (undated) DEVICE — INTENDED FOR TISSUE SEPARATION, AND OTHER PROCEDURES THAT REQUIRE A SHARP SURGICAL BLADE TO PUNCTURE OR CUT.: Brand: BARD-PARKER ® STAINLESS STEEL BLADES

## (undated) DEVICE — SMOKE EVACUATION TUBING WITH 7/8 IN TO 1/4 IN REDUCER: Brand: BUFFALO FILTER

## (undated) DEVICE — DRP MICROSCOPE 4 BINOCULAR CV 54X150IN